# Patient Record
Sex: FEMALE | Race: WHITE | NOT HISPANIC OR LATINO | Employment: OTHER | URBAN - NONMETROPOLITAN AREA
[De-identification: names, ages, dates, MRNs, and addresses within clinical notes are randomized per-mention and may not be internally consistent; named-entity substitution may affect disease eponyms.]

---

## 2019-12-27 ENCOUNTER — APPOINTMENT (EMERGENCY)
Dept: CT IMAGING | Facility: HOSPITAL | Age: 79
DRG: 481 | End: 2019-12-27
Payer: COMMERCIAL

## 2019-12-27 ENCOUNTER — APPOINTMENT (EMERGENCY)
Dept: RADIOLOGY | Facility: HOSPITAL | Age: 79
DRG: 481 | End: 2019-12-27
Payer: COMMERCIAL

## 2019-12-27 ENCOUNTER — HOSPITAL ENCOUNTER (INPATIENT)
Facility: HOSPITAL | Age: 79
LOS: 4 days | Discharge: NURSING, REHAB OR LONG-TERM CARE | DRG: 481 | End: 2019-12-31
Attending: EMERGENCY MEDICINE | Admitting: INTERNAL MEDICINE
Payer: COMMERCIAL

## 2019-12-27 DIAGNOSIS — S00.93XA CONTUSION OF HEAD: ICD-10-CM
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
## 2019-12-27 DIAGNOSIS — S72.001A CLOSED DISPLACED FRACTURE OF RIGHT FEMORAL NECK (HCC): ICD-10-CM

## 2019-12-27 DIAGNOSIS — N30.00 ACUTE CYSTITIS WITHOUT HEMATURIA: ICD-10-CM

## 2019-12-27 PROBLEM — I10 ESSENTIAL HYPERTENSION: Status: ACTIVE | Noted: 2019-12-27

## 2019-12-27 PROBLEM — G20 PARKINSON DISEASE (HCC): Status: ACTIVE | Noted: 2019-12-27
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
## 2019-12-27 LAB
ANION GAP SERPL CALCULATED.3IONS-SCNC: 9 MMOL/L (ref 4–13)
BACTERIA UR QL AUTO: ABNORMAL /HPF
BASOPHILS # BLD AUTO: 0.04 THOUSANDS/ΜL (ref 0–0.1)
BASOPHILS NFR BLD AUTO: 1 % (ref 0–1)
BILIRUB UR QL STRIP: NEGATIVE
BUN SERPL-MCNC: 23 MG/DL (ref 5–25)
CALCIUM SERPL-MCNC: 10.1 MG/DL (ref 8.3–10.1)
CHLORIDE SERPL-SCNC: 102 MMOL/L (ref 100–108)
CLARITY UR: ABNORMAL
CO2 SERPL-SCNC: 27 MMOL/L (ref 21–32)
COLOR UR: YELLOW
CREAT SERPL-MCNC: 0.87 MG/DL (ref 0.6–1.3)
EOSINOPHIL # BLD AUTO: 0.13 THOUSAND/ΜL (ref 0–0.61)
EOSINOPHIL NFR BLD AUTO: 2 % (ref 0–6)
ERYTHROCYTE [DISTWIDTH] IN BLOOD BY AUTOMATED COUNT: 14.9 % (ref 11.6–15.1)
GFR SERPL CREATININE-BSD FRML MDRD: 63 ML/MIN/1.73SQ M
GLUCOSE SERPL-MCNC: 95 MG/DL (ref 65–140)
GLUCOSE UR STRIP-MCNC: NEGATIVE MG/DL
HCT VFR BLD AUTO: 38.1 % (ref 34.8–46.1)
HGB BLD-MCNC: 12 G/DL (ref 11.5–15.4)
HGB UR QL STRIP.AUTO: NEGATIVE
IMM GRANULOCYTES # BLD AUTO: 0.07 THOUSAND/UL (ref 0–0.2)
IMM GRANULOCYTES NFR BLD AUTO: 1 % (ref 0–2)
INR PPP: 1.05 (ref 0.84–1.19)
KETONES UR STRIP-MCNC: NEGATIVE MG/DL
LEUKOCYTE ESTERASE UR QL STRIP: NEGATIVE
LYMPHOCYTES # BLD AUTO: 1.37 THOUSANDS/ΜL (ref 0.6–4.47)
LYMPHOCYTES NFR BLD AUTO: 21 % (ref 14–44)
MAGNESIUM SERPL-MCNC: 1.9 MG/DL (ref 1.6–2.6)
MCH RBC QN AUTO: 28.7 PG (ref 26.8–34.3)
MCHC RBC AUTO-ENTMCNC: 31.5 G/DL (ref 31.4–37.4)
MCV RBC AUTO: 91 FL (ref 82–98)
MONOCYTES # BLD AUTO: 0.45 THOUSAND/ΜL (ref 0.17–1.22)
MONOCYTES NFR BLD AUTO: 7 % (ref 4–12)
NEUTROPHILS # BLD AUTO: 4.45 THOUSANDS/ΜL (ref 1.85–7.62)
NEUTS SEG NFR BLD AUTO: 68 % (ref 43–75)
NITRITE UR QL STRIP: POSITIVE
NON-SQ EPI CELLS URNS QL MICRO: ABNORMAL /HPF
NRBC BLD AUTO-RTO: 0 /100 WBCS
PH UR STRIP.AUTO: 5.5 [PH]
PLATELET # BLD AUTO: 193 THOUSANDS/UL (ref 149–390)
PMV BLD AUTO: 9.9 FL (ref 8.9–12.7)
POTASSIUM SERPL-SCNC: 3.4 MMOL/L (ref 3.5–5.3)
PROT UR STRIP-MCNC: NEGATIVE MG/DL
PROTHROMBIN TIME: 13.7 SECONDS (ref 11.6–14.5)
RBC # BLD AUTO: 4.18 MILLION/UL (ref 3.81–5.12)
RBC #/AREA URNS AUTO: ABNORMAL /HPF
SODIUM SERPL-SCNC: 138 MMOL/L (ref 136–145)
SP GR UR STRIP.AUTO: 1.02 (ref 1–1.03)
UROBILINOGEN UR QL STRIP.AUTO: 0.2 E.U./DL
WBC # BLD AUTO: 6.51 THOUSAND/UL (ref 4.31–10.16)
WBC #/AREA URNS AUTO: ABNORMAL /HPF

## 2019-12-27 PROCEDURE — 72125 CT NECK SPINE W/O DYE: CPT

## 2019-12-27 PROCEDURE — 99222 1ST HOSP IP/OBS MODERATE 55: CPT | Performed by: PHYSICIAN ASSISTANT

## 2019-12-27 PROCEDURE — 99285 EMERGENCY DEPT VISIT HI MDM: CPT | Performed by: EMERGENCY MEDICINE

## 2019-12-27 PROCEDURE — 87186 SC STD MICRODIL/AGAR DIL: CPT | Performed by: PHYSICIAN ASSISTANT

## 2019-12-27 PROCEDURE — 87077 CULTURE AEROBIC IDENTIFY: CPT | Performed by: PHYSICIAN ASSISTANT

## 2019-12-27 PROCEDURE — 85025 COMPLETE CBC W/AUTO DIFF WBC: CPT | Performed by: EMERGENCY MEDICINE

## 2019-12-27 PROCEDURE — 73700 CT LOWER EXTREMITY W/O DYE: CPT

## 2019-12-27 PROCEDURE — 96365 THER/PROPH/DIAG IV INF INIT: CPT

## 2019-12-27 PROCEDURE — 71045 X-RAY EXAM CHEST 1 VIEW: CPT

## 2019-12-27 PROCEDURE — 87086 URINE CULTURE/COLONY COUNT: CPT | Performed by: PHYSICIAN ASSISTANT

## 2019-12-27 PROCEDURE — 99285 EMERGENCY DEPT VISIT HI MDM: CPT

## 2019-12-27 PROCEDURE — 71260 CT THORAX DX C+: CPT

## 2019-12-27 PROCEDURE — 70450 CT HEAD/BRAIN W/O DYE: CPT

## 2019-12-27 PROCEDURE — 80048 BASIC METABOLIC PNL TOTAL CA: CPT | Performed by: EMERGENCY MEDICINE

## 2019-12-27 PROCEDURE — 73502 X-RAY EXAM HIP UNI 2-3 VIEWS: CPT

## 2019-12-27 PROCEDURE — 81001 URINALYSIS AUTO W/SCOPE: CPT

## 2019-12-27 PROCEDURE — 83735 ASSAY OF MAGNESIUM: CPT | Performed by: EMERGENCY MEDICINE

## 2019-12-27 PROCEDURE — 36415 COLL VENOUS BLD VENIPUNCTURE: CPT | Performed by: EMERGENCY MEDICINE

## 2019-12-27 PROCEDURE — 85610 PROTHROMBIN TIME: CPT | Performed by: EMERGENCY MEDICINE

## 2019-12-27 RX ORDER — HYDROCHLOROTHIAZIDE 12.5 MG/1
12.5 TABLET ORAL ONCE
Status: COMPLETED | OUTPATIENT
Start: 2019-12-27 | End: 2019-12-27

## 2019-12-27 RX ORDER — ACETAMINOPHEN 325 MG/1
975 TABLET ORAL ONCE
Status: COMPLETED | OUTPATIENT
Start: 2019-12-27 | End: 2019-12-27

## 2019-12-27 RX ORDER — LABETALOL 20 MG/4 ML (5 MG/ML) INTRAVENOUS SYRINGE
10 ONCE
Status: COMPLETED | OUTPATIENT
Start: 2019-12-27 | End: 2019-12-27

## 2019-12-27 RX ORDER — HYDROCHLOROTHIAZIDE 12.5 MG/1
12.5 CAPSULE, GELATIN COATED ORAL DAILY
COMMUNITY
Start: 2019-09-24

## 2019-12-27 RX ORDER — CHLORAL HYDRATE 500 MG
1000 CAPSULE ORAL DAILY
COMMUNITY

## 2019-12-27 RX ORDER — PROPRANOLOL HYDROCHLORIDE 20 MG/1
20 TABLET ORAL 2 TIMES DAILY
COMMUNITY
Start: 2019-09-24

## 2019-12-27 RX ORDER — POTASSIUM CHLORIDE 20 MEQ/1
40 TABLET, EXTENDED RELEASE ORAL ONCE
Status: COMPLETED | OUTPATIENT
Start: 2019-12-27 | End: 2019-12-27

## 2019-12-27 RX ORDER — FERROUS SULFATE 325(65) MG
325 TABLET ORAL
COMMUNITY

## 2019-12-27 RX ORDER — PROPRANOLOL HYDROCHLORIDE 20 MG/1
20 TABLET ORAL ONCE
Status: COMPLETED | OUTPATIENT
Start: 2019-12-27 | End: 2019-12-27

## 2019-12-27 RX ORDER — ERGOCALCIFEROL 1.25 MG/1
50000 CAPSULE ORAL WEEKLY
COMMUNITY

## 2019-12-27 RX ORDER — CEFAZOLIN SODIUM 2 G/50ML
2000 SOLUTION INTRAVENOUS ONCE
Status: COMPLETED | OUTPATIENT
Start: 2019-12-27 | End: 2019-12-27

## 2019-12-27 RX ORDER — FUROSEMIDE 20 MG/1
20 TABLET ORAL DAILY
COMMUNITY
Start: 2019-11-05 | End: 2021-11-11

## 2019-12-27 RX ADMIN — ACETAMINOPHEN 975 MG: 325 TABLET, FILM COATED ORAL at 22:23

## 2019-12-27 RX ADMIN — HYDROCHLOROTHIAZIDE 12.5 MG: 12.5 TABLET ORAL at 22:38

## 2019-12-27 RX ADMIN — POTASSIUM CHLORIDE 40 MEQ: 1500 TABLET, EXTENDED RELEASE ORAL at 22:24

## 2019-12-27 RX ADMIN — IOHEXOL 100 ML: 350 INJECTION, SOLUTION INTRAVENOUS at 21:40

## 2019-12-27 RX ADMIN — PROPRANOLOL HYDROCHLORIDE 20 MG: 20 TABLET ORAL at 22:22

## 2019-12-27 RX ADMIN — LABETALOL 20 MG/4 ML (5 MG/ML) INTRAVENOUS SYRINGE 10 MG: at 23:55

## 2019-12-27 RX ADMIN — CEFAZOLIN SODIUM 2000 MG: 2 SOLUTION INTRAVENOUS at 21:14

## 2019-12-28 ENCOUNTER — ANESTHESIA EVENT (INPATIENT)
Dept: PERIOP | Facility: HOSPITAL | Age: 79
DRG: 481 | End: 2019-12-28
Payer: COMMERCIAL

## 2019-12-28 ENCOUNTER — ANESTHESIA (INPATIENT)
Dept: PERIOP | Facility: HOSPITAL | Age: 79
DRG: 481 | End: 2019-12-28
Payer: COMMERCIAL

## 2019-12-28 ENCOUNTER — APPOINTMENT (INPATIENT)
Dept: RADIOLOGY | Facility: HOSPITAL | Age: 79
DRG: 481 | End: 2019-12-28
Payer: COMMERCIAL

## 2019-12-28 PROBLEM — E87.6 HYPOKALEMIA: Status: ACTIVE | Noted: 2019-12-28

## 2019-12-28 PROBLEM — S72.001A CLOSED DISPLACED FRACTURE OF RIGHT FEMORAL NECK (HCC): Status: ACTIVE | Noted: 2019-12-27

## 2019-12-28 PROBLEM — I16.0 HYPERTENSIVE URGENCY: Status: ACTIVE | Noted: 2019-12-27

## 2019-12-28 PROBLEM — I48.21 ATRIAL FIBRILLATION, PERMANENT (HCC): Status: ACTIVE | Noted: 2019-12-28

## 2019-12-28 LAB
ALBUMIN SERPL BCP-MCNC: 3.3 G/DL (ref 3.5–5)
ALP SERPL-CCNC: 77 U/L (ref 46–116)
ALT SERPL W P-5'-P-CCNC: 16 U/L (ref 12–78)
ANION GAP SERPL CALCULATED.3IONS-SCNC: 9 MMOL/L (ref 4–13)
APTT PPP: 30 SECONDS (ref 23–37)
AST SERPL W P-5'-P-CCNC: 23 U/L (ref 5–45)
BASOPHILS # BLD AUTO: 0.05 THOUSANDS/ΜL (ref 0–0.1)
BASOPHILS NFR BLD AUTO: 1 % (ref 0–1)
BILIRUB SERPL-MCNC: 0.8 MG/DL (ref 0.2–1)
BUN SERPL-MCNC: 17 MG/DL (ref 5–25)
CALCIUM SERPL-MCNC: 9.6 MG/DL (ref 8.3–10.1)
CHLORIDE SERPL-SCNC: 102 MMOL/L (ref 100–108)
CO2 SERPL-SCNC: 26 MMOL/L (ref 21–32)
CREAT SERPL-MCNC: 0.81 MG/DL (ref 0.6–1.3)
EOSINOPHIL # BLD AUTO: 0.09 THOUSAND/ΜL (ref 0–0.61)
EOSINOPHIL NFR BLD AUTO: 1 % (ref 0–6)
ERYTHROCYTE [DISTWIDTH] IN BLOOD BY AUTOMATED COUNT: 14.8 % (ref 11.6–15.1)
GFR SERPL CREATININE-BSD FRML MDRD: 68 ML/MIN/1.73SQ M
GLUCOSE SERPL-MCNC: 107 MG/DL (ref 65–140)
HCT VFR BLD AUTO: 36.2 % (ref 34.8–46.1)
HGB BLD-MCNC: 11.5 G/DL (ref 11.5–15.4)
IMM GRANULOCYTES # BLD AUTO: 0.04 THOUSAND/UL (ref 0–0.2)
IMM GRANULOCYTES NFR BLD AUTO: 1 % (ref 0–2)
INR PPP: 1.16 (ref 0.84–1.19)
LYMPHOCYTES # BLD AUTO: 0.83 THOUSANDS/ΜL (ref 0.6–4.47)
LYMPHOCYTES NFR BLD AUTO: 11 % (ref 14–44)
MAGNESIUM SERPL-MCNC: 1.8 MG/DL (ref 1.6–2.6)
MCH RBC QN AUTO: 28.6 PG (ref 26.8–34.3)
MCHC RBC AUTO-ENTMCNC: 31.8 G/DL (ref 31.4–37.4)
MCV RBC AUTO: 90 FL (ref 82–98)
MONOCYTES # BLD AUTO: 0.5 THOUSAND/ΜL (ref 0.17–1.22)
MONOCYTES NFR BLD AUTO: 7 % (ref 4–12)
NEUTROPHILS # BLD AUTO: 6.01 THOUSANDS/ΜL (ref 1.85–7.62)
NEUTS SEG NFR BLD AUTO: 79 % (ref 43–75)
NRBC BLD AUTO-RTO: 0 /100 WBCS
PHOSPHATE SERPL-MCNC: 2.9 MG/DL (ref 2.3–4.1)
PLATELET # BLD AUTO: 168 THOUSANDS/UL (ref 149–390)
PMV BLD AUTO: 10 FL (ref 8.9–12.7)
POTASSIUM SERPL-SCNC: 3.9 MMOL/L (ref 3.5–5.3)
PROT SERPL-MCNC: 6.8 G/DL (ref 6.4–8.2)
PROTHROMBIN TIME: 14.9 SECONDS (ref 11.6–14.5)
RBC # BLD AUTO: 4.02 MILLION/UL (ref 3.81–5.12)
SODIUM SERPL-SCNC: 137 MMOL/L (ref 136–145)
WBC # BLD AUTO: 7.52 THOUSAND/UL (ref 4.31–10.16)

## 2019-12-28 PROCEDURE — 99232 SBSQ HOSP IP/OBS MODERATE 35: CPT | Performed by: PHYSICIAN ASSISTANT

## 2019-12-28 PROCEDURE — 85730 THROMBOPLASTIN TIME PARTIAL: CPT | Performed by: PHYSICIAN ASSISTANT

## 2019-12-28 PROCEDURE — C1713 ANCHOR/SCREW BN/BN,TIS/BN: HCPCS | Performed by: ORTHOPAEDIC SURGERY

## 2019-12-28 PROCEDURE — 27235 TREAT THIGH FRACTURE: CPT | Performed by: ORTHOPAEDIC SURGERY

## 2019-12-28 PROCEDURE — 73502 X-RAY EXAM HIP UNI 2-3 VIEWS: CPT

## 2019-12-28 PROCEDURE — 84100 ASSAY OF PHOSPHORUS: CPT | Performed by: PHYSICIAN ASSISTANT

## 2019-12-28 PROCEDURE — 85025 COMPLETE CBC W/AUTO DIFF WBC: CPT | Performed by: PHYSICIAN ASSISTANT

## 2019-12-28 PROCEDURE — 0QS804Z REPOSITION RIGHT FEMORAL SHAFT WITH INTERNAL FIXATION DEVICE, OPEN APPROACH: ICD-10-PCS | Performed by: ORTHOPAEDIC SURGERY

## 2019-12-28 PROCEDURE — 80053 COMPREHEN METABOLIC PANEL: CPT | Performed by: PHYSICIAN ASSISTANT

## 2019-12-28 PROCEDURE — 83735 ASSAY OF MAGNESIUM: CPT | Performed by: PHYSICIAN ASSISTANT

## 2019-12-28 PROCEDURE — 27235 TREAT THIGH FRACTURE: CPT | Performed by: PHYSICIAN ASSISTANT

## 2019-12-28 PROCEDURE — 85610 PROTHROMBIN TIME: CPT | Performed by: PHYSICIAN ASSISTANT

## 2019-12-28 DEVICE — IMPLANTABLE DEVICE: Type: IMPLANTABLE DEVICE | Site: HIP | Status: FUNCTIONAL

## 2019-12-28 RX ORDER — HYDRALAZINE HYDROCHLORIDE 20 MG/ML
10 INJECTION INTRAMUSCULAR; INTRAVENOUS ONCE
Status: COMPLETED | OUTPATIENT
Start: 2019-12-28 | End: 2019-12-28

## 2019-12-28 RX ORDER — OXYCODONE HYDROCHLORIDE 10 MG/1
10 TABLET ORAL EVERY 4 HOURS PRN
Status: DISCONTINUED | OUTPATIENT
Start: 2019-12-28 | End: 2019-12-31 | Stop reason: HOSPADM

## 2019-12-28 RX ORDER — IRON POLYSACCHARIDE COMPLEX 150 MG
150 CAPSULE ORAL
Status: DISCONTINUED | OUTPATIENT
Start: 2019-12-28 | End: 2019-12-31 | Stop reason: HOSPADM

## 2019-12-28 RX ORDER — DEXAMETHASONE SODIUM PHOSPHATE 4 MG/ML
INJECTION, SOLUTION INTRA-ARTICULAR; INTRALESIONAL; INTRAMUSCULAR; INTRAVENOUS; SOFT TISSUE AS NEEDED
Status: DISCONTINUED | OUTPATIENT
Start: 2019-12-28 | End: 2019-12-28 | Stop reason: SURG

## 2019-12-28 RX ORDER — CEFAZOLIN SODIUM 1 G/50ML
1000 SOLUTION INTRAVENOUS EVERY 8 HOURS
Status: DISCONTINUED | OUTPATIENT
Start: 2019-12-28 | End: 2019-12-28

## 2019-12-28 RX ORDER — LABETALOL 20 MG/4 ML (5 MG/ML) INTRAVENOUS SYRINGE
5 EVERY 4 HOURS PRN
Status: DISCONTINUED | OUTPATIENT
Start: 2019-12-28 | End: 2019-12-31 | Stop reason: HOSPADM

## 2019-12-28 RX ORDER — FENTANYL CITRATE 50 UG/ML
INJECTION, SOLUTION INTRAMUSCULAR; INTRAVENOUS AS NEEDED
Status: DISCONTINUED | OUTPATIENT
Start: 2019-12-28 | End: 2019-12-28 | Stop reason: SURG

## 2019-12-28 RX ORDER — SODIUM CHLORIDE, SODIUM LACTATE, POTASSIUM CHLORIDE, CALCIUM CHLORIDE 600; 310; 30; 20 MG/100ML; MG/100ML; MG/100ML; MG/100ML
125 INJECTION, SOLUTION INTRAVENOUS CONTINUOUS
Status: ACTIVE | OUTPATIENT
Start: 2019-12-28 | End: 2019-12-28

## 2019-12-28 RX ORDER — CHLORHEXIDINE GLUCONATE 4 G/100ML
SOLUTION TOPICAL DAILY PRN
Status: CANCELLED | OUTPATIENT
Start: 2019-12-28

## 2019-12-28 RX ORDER — ONDANSETRON 2 MG/ML
4 INJECTION INTRAMUSCULAR; INTRAVENOUS EVERY 6 HOURS PRN
Status: DISCONTINUED | OUTPATIENT
Start: 2019-12-28 | End: 2019-12-31 | Stop reason: HOSPADM

## 2019-12-28 RX ORDER — CEFAZOLIN SODIUM 2 G/50ML
2000 SOLUTION INTRAVENOUS EVERY 8 HOURS
Status: DISCONTINUED | OUTPATIENT
Start: 2019-12-28 | End: 2019-12-31 | Stop reason: HOSPADM

## 2019-12-28 RX ORDER — ONDANSETRON 2 MG/ML
INJECTION INTRAMUSCULAR; INTRAVENOUS AS NEEDED
Status: DISCONTINUED | OUTPATIENT
Start: 2019-12-28 | End: 2019-12-28 | Stop reason: SURG

## 2019-12-28 RX ORDER — HEPARIN SODIUM 5000 [USP'U]/ML
5000 INJECTION, SOLUTION INTRAVENOUS; SUBCUTANEOUS EVERY 8 HOURS SCHEDULED
Status: DISCONTINUED | OUTPATIENT
Start: 2019-12-28 | End: 2019-12-29

## 2019-12-28 RX ORDER — CEFAZOLIN SODIUM 2 G/50ML
2000 SOLUTION INTRAVENOUS ONCE
Status: CANCELLED | OUTPATIENT
Start: 2019-12-28 | End: 2019-12-28

## 2019-12-28 RX ORDER — HYDROCHLOROTHIAZIDE 12.5 MG/1
12.5 TABLET ORAL 2 TIMES DAILY
Status: DISCONTINUED | OUTPATIENT
Start: 2019-12-28 | End: 2019-12-28

## 2019-12-28 RX ORDER — LIDOCAINE HYDROCHLORIDE 10 MG/ML
INJECTION, SOLUTION EPIDURAL; INFILTRATION; INTRACAUDAL; PERINEURAL AS NEEDED
Status: DISCONTINUED | OUTPATIENT
Start: 2019-12-28 | End: 2019-12-28 | Stop reason: SURG

## 2019-12-28 RX ORDER — CHLORAL HYDRATE 500 MG
1000 CAPSULE ORAL DAILY
Status: DISCONTINUED | OUTPATIENT
Start: 2019-12-28 | End: 2019-12-31 | Stop reason: HOSPADM

## 2019-12-28 RX ORDER — OXYCODONE HYDROCHLORIDE 5 MG/1
5 TABLET ORAL EVERY 4 HOURS PRN
Status: DISCONTINUED | OUTPATIENT
Start: 2019-12-28 | End: 2019-12-31 | Stop reason: HOSPADM

## 2019-12-28 RX ORDER — BUPIVACAINE HYDROCHLORIDE AND EPINEPHRINE 2.5; 5 MG/ML; UG/ML
INJECTION, SOLUTION INFILTRATION; PERINEURAL AS NEEDED
Status: DISCONTINUED | OUTPATIENT
Start: 2019-12-28 | End: 2019-12-28 | Stop reason: HOSPADM

## 2019-12-28 RX ORDER — ROCURONIUM BROMIDE 10 MG/ML
INJECTION, SOLUTION INTRAVENOUS AS NEEDED
Status: DISCONTINUED | OUTPATIENT
Start: 2019-12-28 | End: 2019-12-28 | Stop reason: SURG

## 2019-12-28 RX ORDER — PROPOFOL 10 MG/ML
INJECTION, EMULSION INTRAVENOUS AS NEEDED
Status: DISCONTINUED | OUTPATIENT
Start: 2019-12-28 | End: 2019-12-28 | Stop reason: SURG

## 2019-12-28 RX ORDER — PROPRANOLOL HYDROCHLORIDE 20 MG/1
20 TABLET ORAL 2 TIMES DAILY
Status: DISCONTINUED | OUTPATIENT
Start: 2019-12-28 | End: 2019-12-31 | Stop reason: HOSPADM

## 2019-12-28 RX ORDER — ACETAMINOPHEN 325 MG/1
650 TABLET ORAL EVERY 6 HOURS PRN
Status: DISCONTINUED | OUTPATIENT
Start: 2019-12-28 | End: 2019-12-31 | Stop reason: HOSPADM

## 2019-12-28 RX ORDER — FERROUS SULFATE 325(65) MG
325 TABLET ORAL
Status: DISCONTINUED | OUTPATIENT
Start: 2019-12-28 | End: 2019-12-31 | Stop reason: HOSPADM

## 2019-12-28 RX ORDER — TIMOLOL MALEATE 5 MG/ML
1 SOLUTION/ DROPS OPHTHALMIC 2 TIMES DAILY
Status: DISCONTINUED | OUTPATIENT
Start: 2019-12-28 | End: 2019-12-31 | Stop reason: HOSPADM

## 2019-12-28 RX ORDER — HEPARIN SODIUM 5000 [USP'U]/ML
5000 INJECTION, SOLUTION INTRAVENOUS; SUBCUTANEOUS EVERY 8 HOURS SCHEDULED
Status: DISCONTINUED | OUTPATIENT
Start: 2019-12-28 | End: 2019-12-28

## 2019-12-28 RX ORDER — SODIUM CHLORIDE, SODIUM LACTATE, POTASSIUM CHLORIDE, CALCIUM CHLORIDE 600; 310; 30; 20 MG/100ML; MG/100ML; MG/100ML; MG/100ML
INJECTION, SOLUTION INTRAVENOUS CONTINUOUS PRN
Status: DISCONTINUED | OUTPATIENT
Start: 2019-12-28 | End: 2019-12-28 | Stop reason: SURG

## 2019-12-28 RX ADMIN — PHENYLEPHRINE HYDROCHLORIDE 100 MCG: 10 INJECTION INTRAVENOUS at 11:09

## 2019-12-28 RX ADMIN — PROPOFOL 100 MG: 10 INJECTION, EMULSION INTRAVENOUS at 11:09

## 2019-12-28 RX ADMIN — PROPOFOL 50 MG: 10 INJECTION, EMULSION INTRAVENOUS at 11:47

## 2019-12-28 RX ADMIN — ONDANSETRON HYDROCHLORIDE 4 MG: 2 INJECTION, SOLUTION INTRAVENOUS at 11:56

## 2019-12-28 RX ADMIN — LIDOCAINE HYDROCHLORIDE 50 MG: 10 INJECTION, SOLUTION EPIDURAL; INFILTRATION; INTRACAUDAL; PERINEURAL at 11:09

## 2019-12-28 RX ADMIN — PROPRANOLOL HYDROCHLORIDE 20 MG: 20 TABLET ORAL at 09:45

## 2019-12-28 RX ADMIN — CEFAZOLIN SODIUM 2000 MG: 2 SOLUTION INTRAVENOUS at 21:15

## 2019-12-28 RX ADMIN — HEPARIN SODIUM 5000 UNITS: 5000 INJECTION INTRAVENOUS; SUBCUTANEOUS at 06:15

## 2019-12-28 RX ADMIN — SODIUM CHLORIDE, SODIUM LACTATE, POTASSIUM CHLORIDE, AND CALCIUM CHLORIDE: .6; .31; .03; .02 INJECTION, SOLUTION INTRAVENOUS at 11:01

## 2019-12-28 RX ADMIN — CARBIDOPA AND LEVODOPA 1 TABLET: 25; 100 TABLET ORAL at 21:11

## 2019-12-28 RX ADMIN — ROCURONIUM BROMIDE 30 MG: 50 INJECTION, SOLUTION INTRAVENOUS at 11:09

## 2019-12-28 RX ADMIN — CARBIDOPA AND LEVODOPA 1 TABLET: 25; 100 TABLET ORAL at 17:16

## 2019-12-28 RX ADMIN — HEPARIN SODIUM 5000 UNITS: 5000 INJECTION INTRAVENOUS; SUBCUTANEOUS at 00:46

## 2019-12-28 RX ADMIN — TIMOLOL MALEATE 1 DROP: 5 SOLUTION/ DROPS OPHTHALMIC at 13:29

## 2019-12-28 RX ADMIN — HEPARIN SODIUM 5000 UNITS: 5000 INJECTION INTRAVENOUS; SUBCUTANEOUS at 21:11

## 2019-12-28 RX ADMIN — PROPRANOLOL HYDROCHLORIDE 20 MG: 20 TABLET ORAL at 17:16

## 2019-12-28 RX ADMIN — SUGAMMADEX 100 MG: 100 INJECTION, SOLUTION INTRAVENOUS at 12:01

## 2019-12-28 RX ADMIN — CEFAZOLIN SODIUM 2000 MG: 2 SOLUTION INTRAVENOUS at 04:46

## 2019-12-28 RX ADMIN — FENTANYL CITRATE 25 MCG: 50 INJECTION, SOLUTION INTRAMUSCULAR; INTRAVENOUS at 11:38

## 2019-12-28 RX ADMIN — POLYSACCHARIDE-IRON COMPLEX 150 MG: 150 CAPSULE ORAL at 09:46

## 2019-12-28 RX ADMIN — CARBIDOPA AND LEVODOPA 1 TABLET: 25; 100 TABLET ORAL at 09:46

## 2019-12-28 RX ADMIN — TIMOLOL MALEATE 1 DROP: 5 SOLUTION/ DROPS OPHTHALMIC at 17:17

## 2019-12-28 RX ADMIN — FENTANYL CITRATE 50 MCG: 50 INJECTION, SOLUTION INTRAMUSCULAR; INTRAVENOUS at 11:47

## 2019-12-28 RX ADMIN — OMEGA-3 FATTY ACIDS CAP 1000 MG 1000 MG: 1000 CAP at 09:45

## 2019-12-28 RX ADMIN — FENTANYL CITRATE 25 MCG: 50 INJECTION, SOLUTION INTRAMUSCULAR; INTRAVENOUS at 11:32

## 2019-12-28 RX ADMIN — DEXAMETHASONE SODIUM PHOSPHATE 4 MG: 4 INJECTION, SOLUTION INTRAMUSCULAR; INTRAVENOUS at 11:40

## 2019-12-28 RX ADMIN — FERROUS SULFATE TAB 325 MG (65 MG ELEMENTAL FE) 325 MG: 325 (65 FE) TAB at 09:45

## 2019-12-28 RX ADMIN — HYDRALAZINE HYDROCHLORIDE 10 MG: 20 INJECTION INTRAMUSCULAR; INTRAVENOUS at 09:45

## 2019-12-28 RX ADMIN — CEFAZOLIN SODIUM 2000 MG: 2 SOLUTION INTRAVENOUS at 11:10

## 2019-12-28 RX ADMIN — LABETALOL HYDROCHLORIDE 5 MG: 5 INJECTION, SOLUTION INTRAVENOUS at 01:22

## 2019-12-28 RX ADMIN — PHENYLEPHRINE HYDROCHLORIDE 40 MCG/MIN: 10 INJECTION INTRAVENOUS at 11:29

## 2019-12-28 RX ADMIN — PHENYLEPHRINE HYDROCHLORIDE 100 MCG: 10 INJECTION INTRAVENOUS at 11:13

## 2019-12-28 NOTE — ASSESSMENT & PLAN NOTE
UA shows-positive nitrites, 4-10 WBCs, innumerable bacteria  She denies abdominal pain, dysuria  Started on IV cephazolin in the ER  Urine culture pending  Continue IV cefazolin for now  Monitor renal output/renal function  AM labs  Supportive care

## 2019-12-28 NOTE — ANESTHESIA PREPROCEDURE EVALUATION
Review of Systems/Medical History      History of anesthetic complications     Cardiovascular  Hypertension , Cardiac stents > 1 year Dysrhythmias , atrial fibrillation,   Comment: Cardiac stents 2003    afib (eliquis),  Pulmonary  Negative pulmonary ROS        GI/Hepatic  Negative GI/hepatic ROS               Endo/Other  Negative endo/other ROS      GYN       Hematology  Negative hematology ROS      Musculoskeletal    Comment: Hip fx      Neurology  Negative neurology ROS      Psychology       Comment: PD         Physical Exam    Airway    Mallampati score: III  TM Distance: >3 FB  Neck ROM: full     Dental   upper dentures,     Cardiovascular      Pulmonary      Other Findings        Anesthesia Plan  ASA Score- 3     Anesthesia Type- general with ASA Monitors  Additional Monitors:   Airway Plan: ETT  Comment: General anesthesia, endotracheal tube; standard ASA monitors  Risks and benefits discussed with patient; patient consented and agrees to proceed  I saw and evaluated the patient  If seen with CRNA, we have discussed the anesthetic plan and I am in agreement that the plan is appropriate for the patient        Plan Factors-    Induction- intravenous  Postoperative Plan- Plan for postoperative opioid use  Planned trial extubation    Informed Consent- Anesthetic plan and risks discussed with patient  I personally reviewed this patient with the CRNA  Discussed and agreed on the Anesthesia Plan with the CRNA  Derek Barlow

## 2019-12-28 NOTE — ASSESSMENT & PLAN NOTE
She had a fall while exiting her car  She was unable to bear weight or stand after the fall  X-ray right hip completed official report pending  CT right hip shows-Nondisplaced fracture of the femoral neck as described above  ER attending discussed case with Dr Aliya Parkinson (orthopedic surgery)  Admit to med surg  Hold Xarelto  Encourage to limit excessive movement  Pain management PRN  Zofran PRN  Orthopedic surgery consult   Possible OR Sunday or Monday per Dr Adolph hendrix

## 2019-12-28 NOTE — SOCIAL WORK
Pt going to the OR today for repair of right hip fx  CM will follow up post op re: jean-claude planning

## 2019-12-28 NOTE — ASSESSMENT & PLAN NOTE
Rate currently controlled   Anticoagulated on Xarelto  Will hold Xarelto due to planned OR intervention for hip fracture  Monitor closely

## 2019-12-28 NOTE — H&P
[facility] SPECIALTY [facility] Internal Medicine  H&P- Matias An 1940, 78 y o  female MRN: [id number]    Unit/Bed#: 411-01 Encounter: [id number]    Primary Care Provider: Jeannette Brandt MD   Date and time admitted to hospital: 12/27/2019  7:34 PM        * Closed right hip fracture [facility])  Assessment & Plan  She had a fall while exiting her car  She was unable to bear weight or stand after the fall  X-ray right hip completed official report pending  CT right hip shows-Nondisplaced fracture of the femoral neck as described above  ER attending discussed case with Dr Severino Pedraza (orthopedic surgery)  Admit to med surg  Hold Xarelto  Encourage to limit excessive movement  Pain management PRN  Zofran PRN  Orthopedic surgery consult  Possible OR Sunday or Monday per Dr Severino Pedraza  Am labs  Supportive care      Hypokalemia  Assessment & Plan  Potassium mildly decreased 3 4  She received potassium chloride 40 mg once p o  In the ER  A m  CMP to monitor potassium level    Atrial fibrillation, permanent  Assessment & Plan  Rate currently controlled   Anticoagulated on Xarelto  Will hold Xarelto due to planned OR intervention for hip fracture  Monitor closely    Fall from slip, trip, or stumble, initial encounter  [address] [place] [address]  She report that she lost her balance while coming out of her car  She states that she was ok prior to fall  She did hit he head sustating a hematoma to right side of her forehead  She denies LOC, Headache, Visual disturbances  Head CT shows-   No acute cranial hemorrhage, mass effect or extra-axial collection  2   Right frontal scalp hematoma   No acute calvarial fracture  CT cervical spine shows-No acute cervical spine fracture or traumatic malalignment    She does of radiologic evidence of right femoral neck fracture  Fall precautions  Minimize excessive movement   Supportive care     Acute cystitis without hematuria  Assessment & Plan  UA shows-positive nitrites, 4-10 WBCs, innumerable bacteria  She denies abdominal pain, dysuria  Started on IV cephazolin in the ER  Urine culture pending  Continue IV cefazolin for now  Monitor renal output/renal function  AM labs  Supportive care    Parkinson disease [facility])  Assessment & Plan  Continue Sinemet    Essential hypertension  Assessment & Plan  Blood pressure elevated upon arrival to the ER  She reports that she did not take her evening BP medication  She also has increased pain S/P fall which may be contributing  Continue home medication  PRN IV labetalol  Monitor blood pressure closely  Goal blood pressure of < 675 systolic        VTE Prophylaxis: Heparin  / sequential compression device   Code Status: Level 3 DNAR/DNI  POLST: POLST form is not discussed and not completed at this time  Discussion with family: Family at bedside    Anticipated Length of Stay:  Patient will be admitted on an Inpatient basis with an anticipated length of stay of  > 2 midnights  Justification for Hospital Stay:  Right hip fracture, UTI, mechanical fall initial encounter    Total Time for Visit, including Counseling / Coordination of Care: 30 minutes  Greater than 50% of this total time spent on direct patient counseling and coordination of care  Chief Complaint:   Fall    History of Present Illness:    Adolph Marina is a 78 y o  female who presents to the emergency room for evaluation after a fall while trying to get out of her car  She states that she stumbled and fell did hit her head on the floor but denies loss of consciousness  She states that she was fine prior to the fall did not complain of any dizziness prior to the fall  She reports that she was unable to get up on her own or bear weight on her right leg after the fall  She denies chest pain, shortness of breath, nausea, vomiting, headache, visual disturbances, urinary symptoms abdominal pain  She does admit to pain to the right hip region  Labs completed in emergency room with results as shown below    CT head, CT cervical spine, CT right hip, completed results as shown below  Emergency room she received Tylenol 975 mg p o  Potassium chloride 40 mEq p o  and started on Ancef 2 g IV  ER attending discussed case with Dr Romayne Shouts (orthopedic surgery) recommendations given to admit patient, hold the Xarelto utilize heparin for DVT prophylaxis  Will see patient here on Sunday for possible OR intervention  At bedside patient is awake and alert mild to moderate distress secondary to pain  Patient has been admitted on inpatient status [facility] level care for further management of right hip fracture, mechanical fall, hypokalemia, urinary tract infection  Review of Systems:    Review of Systems   Constitutional: Negative for chills, diaphoresis, fatigue and fever  HENT: Negative for congestion, trouble swallowing and voice change  Eyes: Negative for photophobia, pain, redness and visual disturbance  Respiratory: Negative for cough, chest tightness, shortness of breath and wheezing  Cardiovascular: Negative for chest pain, palpitations and leg swelling  Gastrointestinal: Negative for abdominal pain, diarrhea, nausea and vomiting  Endocrine: Negative for polydipsia, polyphagia and polyuria  Genitourinary: Negative for difficulty urinating, dysuria, flank pain, frequency and hematuria  Musculoskeletal: Positive for gait problem  Negative for arthralgias, back pain, joint swelling, neck pain and neck stiffness  Skin: Negative for color change, pallor, rash and wound  Neurological: Positive for tremors  Negative for dizziness, syncope, weakness and headaches  Psychiatric/Behavioral: Negative for agitation, confusion and sleep disturbance  The patient is not nervous/anxious          Past Medical and Surgical History:     Past Medical History:   Diagnosis Date    [facility]       Past Surgical History:   Procedure Laterality Date    CARDIAC SURGERY      Pt had stent placed in 2003    CHOLECYSTECTOMY      1997    HYSTERECTOMY         Meds/Allergies:    Prior to Admission medications    Medication Sig Start Date End Date Taking? Authorizing Provider   carbidopa-levodopa (SINEMET)  mg per tablet Take 1 tablet by mouth Three times a day 9/24/19  Yes Historical Provider, MD   ergocalciferol (VITAMIN D2) 50,000 units Take 50,000 Units by mouth   Yes Historical Provider, MD   ferrous fumarate-iron polysaccharide complex (TANDEM) 162-115 2 MG CAPS Take 1 capsule by mouth daily with breakfast   Yes Historical Provider, MD   ferrous sulfate 325 (65 Fe) mg tablet Take 325 mg by mouth daily with breakfast   Yes Historical Provider, MD   furosemide (LASIX) 20 mg tablet Take 20 mg by mouth daily 11/5/19 11/4/20 Yes Historical Provider, MD   hydrochlorothiazide (MICROZIDE) 12 5 mg capsule Take 12 5 mg by mouth 9/24/19  Yes Historical Provider, MD   Omega-3 Fatty Acids (FISH OIL) 1,000 mg Take 1,000 mg by mouth daily   Yes Historical Provider, MD   propranolol (INDERAL) 20 mg tablet Take 20 mg by mouth 2 (two) times a day 9/24/19  Yes Historical Provider, MD   timolol (BETIMOL) 0 5 % ophthalmic solution 1 drop 2 (two) times a day   Yes Historical Provider, MD   rivaroxaban (XARELTO) 20 mg tablet Take 20 mg by mouth    Historical Provider, MD     I have reviewed home medications with patient personally  Allergies: No Known Allergies    Social History:     Marital Status: /Civil Union   Occupation: Retired  Patient Pre-hospital Living Situation: Lives with   Patient Pre-hospital Level of Mobility: Active  Patient Pre-hospital Diet Restrictions: None reported  Substance Use History:   Social History     Substance and Sexual Activity   Alcohol Use Never    Frequency: Never     Social History     Tobacco Use   Smoking Status Never Smoker   Smokeless Tobacco Never Used     Social History     Substance and Sexual Activity   Drug Use Never       Family History:    History reviewed   No pertinent family history  Physical Exam:     Vitals:   Blood Pressure: 107/77 (12/28/19 0251)  Pulse: 80 (12/28/19 0110)  Temperature: 98 2 °F (36 8 °C) (12/28/19 0033)  Temp Source: Oral (12/28/19 0033)  Respirations: 17 (12/28/19 0033)  Height: 5' 2" (157 5 cm) (12/28/19 0033)  Weight - Scale: 68 3 kg (150 lb 9 2 oz) (12/28/19 0033)  SpO2: 98 % (12/28/19 0033)    Physical Exam   Constitutional: She is oriented to person, place, and time  She appears distressed  HENT:   Head: Normocephalic  Hematoma to right side of forehead   Eyes: Pupils are equal, round, and reactive to light  EOM are normal  Right eye exhibits no discharge  Left eye exhibits no discharge  No scleral icterus  Neck: Normal range of motion  Neck supple  No JVD present  Cardiovascular: Normal rate and intact distal pulses  Irregularly irregular rhythm   Pulmonary/Chest: Effort normal and breath sounds normal  No stridor  No respiratory distress  She has no wheezes  She has no rales  Abdominal: Soft  Bowel sounds are normal  She exhibits no distension and no mass  There is no tenderness  There is no guarding  Musculoskeletal: She exhibits tenderness  She exhibits no edema or deformity  Decreased range of motion, tenderness, discoloration of right hip   Lymphadenopathy:     She has no cervical adenopathy  Neurological: She is oriented to person, place, and time  Skin: Skin is warm and dry  Capillary refill takes less than 2 seconds  No rash noted  No erythema  No pallor  Psychiatric: She has a normal mood and affect  Vitals reviewed  Additional Data:     Lab Results: I have personally reviewed pertinent reports        Results from last 7 days   Lab Units 12/27/19 2019   WBC Thousand/uL 6 51   HEMOGLOBIN g/dL 12 0   HEMATOCRIT % 38 1   PLATELETS Thousands/uL 193   NEUTROS PCT % 68   LYMPHS PCT % 21   MONOS PCT % 7   EOS PCT % 2     Results from last 7 days   Lab Units 12/27/19 2019   SODIUM mmol/L 138   POTASSIUM mmol/L 3 4* CHLORIDE mmol/L 102   CO2 mmol/L 27   BUN mg/dL 23   CREATININE mg/dL 0 87   ANION GAP mmol/L 9   CALCIUM mg/dL 10 1   GLUCOSE RANDOM mg/dL 95     Results from last 7 days   Lab Units 12/27/19 2019   INR  1 05                   Imaging: I have personally reviewed pertinent reports  CT chest with contrast   Final Result by Sharona Peterson DO (12/27 2324)      No discrete acute process seen in the chest       Fusiform aneurysmal dilatation of the ascending aorta which measures approximately 4 2 cm in maximal dimensions  The aorta is tortuous but no acute aortic process is seen  Predominantly oval-shaped but irregular lesion in the inferior lateral aspect of the left breast which measures approximately 4 5 x 4 0 x 4 1 cm in size  This is best appreciated on axial image 32, series 2  There is some surrounding fat    stranding/edema in the left breast    A component of this may be posttraumatic or related to prior intervention given the patient's history, however correlation with the patient's physical exam as well as follow-up imaging including    mammography/ultrasound could be considered to confirm resolution and exclude underlying breast lesion at the discretion of the referring caregiver  Cardiomegaly, coronary and aortic atherosclerosis, small hiatal hernia, and other findings as above  Findings discussed with Dr Fraser Barley by Dr Alan Nation at 11:20 PM on 12/27/2019  Workstation performed: [id number]         CT hip right without contrast   Final Result by Nii Doyle DO (12/27 2236)      Nondisplaced fracture of the femoral neck as described above  The study was marked in [facility] for immediate notification  Workstation performed: [id number]         CT spine cervical wo contrast   Final Result by Lakia Mcknight MD (12/27 2052)      No acute cervical spine fracture or traumatic malalignment                     Workstation performed: [id number]         CT head wo contrast   Final Result by April Mullen MD (12/27 2056)         1  No acute cranial hemorrhage, mass effect or extra-axial collection  2   Right frontal scalp hematoma  No acute calvarial fracture  Workstation performed: [id number]         XR chest 1 view portable   ED Interpretation by Lexie Pimentel DO (12/27 2104)   Airway is midline  Lungs are clear bilaterally with no evidence of pulmonary vascular congestion/focal infiltrate/pleural effusion//pneumothorax  Cardiac and mediastinal silhouettes are within normal limits  Osseous structures appear normal       XR hip/pelv 2-3 vws right if performed   ED Interpretation by Lexie Pimentel DO (12/27 2104)   Pelvic rings are intact  There is a minimally displaced right femoral neck fracture  EKG, Pathology, and Other Studies Reviewed on Admission:   · EKG:     Allscripts / Epic Records Reviewed: Yes     ** Please Note: This note has been constructed using a voice recognition system   **

## 2019-12-28 NOTE — ASSESSMENT & PLAN NOTE
Rate currently controlled   Anticoagulated on Xarelto  Held Xarelto since yesterday evening due to planned OR intervention for hip fracture  Plan to restart anticoagulation as soon as possible

## 2019-12-28 NOTE — ASSESSMENT & PLAN NOTE
Potassium mildly decreased 3 4  She received potassium chloride 40 mg once p o   In the ER  A m  CMP to monitor potassium level

## 2019-12-28 NOTE — ED NOTES
Patient has bruising to the right eyebrow area that is raised       Alfonza Lesch, RN  12/27/19 6322

## 2019-12-28 NOTE — ASSESSMENT & PLAN NOTE
Potassium mildly decreased 3 4 on admission, now increased to 3 9  She received potassium chloride 40 mg once p o  In the ER  Continue AM BMP

## 2019-12-28 NOTE — ASSESSMENT & PLAN NOTE
UA shows-positive nitrites, 4-10 WBCs, innumerable bacteria with only ocassional epithelial cells    She denies abdominal pain, dysuria  Started on IV cephazolin in the ER  Urine culture pending  Continue IV cefazolin for now  Monitor renal output/renal function  AM labs  Supportive care

## 2019-12-28 NOTE — ASSESSMENT & PLAN NOTE
Blood pressure elevated upon arrival to the ER  She reports that she did not take her evening BP medication  She also has increased pain S/P fall which may be contributing  Continue home medication  PRN IV labetalol  Monitor blood pressure closely   Goal blood pressure of < 593 systolic

## 2019-12-28 NOTE — ASSESSMENT & PLAN NOTE
Appears mechanical in nature - She reports that she lost her balance while coming out of her car  She states that she was ok prior to fall  She did hit he head sustating a hematoma to right side of her forehead  She denies LOC, Headache, Visual disturbances  Head CT shows-   No acute cranial hemorrhage, mass effect or extra-axial collection  2   Right frontal scalp hematoma   No acute calvarial fracture  CT cervical spine shows - No acute cervical spine fracture or traumatic malalignment  Xray and CT R hip shows radiologic evidence of right femoral neck fracture  Continue Fall precautions  Pain control  PT/OT following planned ORIF R hip today

## 2019-12-28 NOTE — PLAN OF CARE
Problem: Potential for Falls  Goal: Patient will remain free of falls  Description  INTERVENTIONS:  - Assess patient frequently for physical needs  -  Identify cognitive and physical deficits and behaviors that affect risk of falls    -  [place] fall precautions as indicated by assessment   - Educate patient/family on patient safety including physical limitations  - Instruct patient to call for assistance with activity based on assessment  - Modify environment to reduce risk of injury  - Consider OT/PT consult to assist with strengthening/mobility  Outcome: Progressing     Problem: Prexisting or High Potential for Compromised Skin Integrity  Goal: Skin integrity is maintained or improved  Description  INTERVENTIONS:  - Identify patients at risk for skin breakdown  - Assess and monitor skin integrity  - Assess and monitor nutrition and hydration status  - Monitor labs   - Assess for incontinence   - Turn and reposition patient  - Assist with mobility/ambulation  - Relieve pressure over bony prominences  - Avoid friction and shearing  - Provide appropriate hygiene as needed including keeping skin clean and dry  - Evaluate need for skin moisturizer/barrier cream  - Collaborate with interdisciplinary team   - Patient/family teaching  - Consider wound care consult   Outcome: Progressing     Problem: RESPIRATORY - ADULT  Goal: Achieves optimal ventilation and oxygenation  Description  INTERVENTIONS:  - Assess for changes in respiratory status  - Assess for changes in mentation and behavior  - Position to facilitate oxygenation and minimize respiratory effort  - Oxygen administered by appropriate delivery if ordered  - Initiate smoking cessation education as indicated  - Encourage broncho-pulmonary hygiene including cough, deep breathe, Incentive Spirometry  - Assess the need for suctioning and aspirate as needed  - Assess and instruct to report SOB or any respiratory difficulty  - Respiratory Therapy support as indicated  Outcome: Progressing     Problem: GASTROINTESTINAL - ADULT  Goal: Minimal or absence of nausea and/or vomiting  Description  INTERVENTIONS:  - Administer IV fluids if ordered to ensure adequate hydration  - Maintain NPO status until nausea and vomiting are resolved  - Nasogastric tube if ordered  - Administer ordered antiemetic medications as needed  - Provide nonpharmacologic comfort measures as appropriate  - Advance diet as tolerated, if ordered  - Consider nutrition services referral to assist patient with adequate nutrition and appropriate food choices  Outcome: Progressing  Goal: Maintains or returns to baseline bowel function  Description  INTERVENTIONS:  - Assess bowel function  - Encourage oral fluids to ensure adequate hydration  - Administer IV fluids if ordered to ensure adequate hydration  - Administer ordered medications as needed  - Encourage mobilization and activity  - Consider nutritional services referral to assist patient with adequate nutrition and appropriate food choices  Outcome: Progressing  Goal: Maintains adequate nutritional intake  Description  INTERVENTIONS:  - Monitor percentage of each meal consumed  - Identify factors contributing to decreased intake, treat as appropriate  - Assist with meals as needed  - Monitor I&O, weight, and lab values if indicated  - Obtain nutrition services referral as needed  Outcome: Progressing     Problem: GENITOURINARY - ADULT  Goal: Maintains or returns to baseline urinary function  Description  INTERVENTIONS:  - Assess urinary function  - Encourage oral fluids to ensure adequate hydration if ordered  - Administer IV fluids as ordered to ensure adequate hydration  - Administer ordered medications as needed  - Offer frequent toileting  - Follow urinary retention protocol if ordered  Outcome: Progressing  Goal: Absence of urinary retention  Description  INTERVENTIONS:  - Assess patients ability to void and empty bladder  - Monitor I/O  - Bladder scan as needed  - Discuss with physician/AP medications to alleviate retention as needed  - Discuss catheterization for long term situations as appropriate  Outcome: Progressing     Problem: METABOLIC, FLUID AND ELECTROLYTES - ADULT  Goal: Electrolytes maintained within normal limits  Description  INTERVENTIONS:  - Monitor labs and assess patient for signs and symptoms of electrolyte imbalances  - Administer electrolyte replacement as ordered  - Monitor response to electrolyte replacements, including repeat lab results as appropriate  - Instruct patient on fluid and nutrition as appropriate  Outcome: Progressing  Goal: Fluid balance maintained  Description  INTERVENTIONS:  - Monitor labs   - Monitor I/O and WT  - Instruct patient on fluid and nutrition as appropriate  - Assess for signs & symptoms of volume excess or deficit  Outcome: Progressing     Problem: SKIN/TISSUE INTEGRITY - ADULT  Goal: Skin integrity remains intact  Description  INTERVENTIONS  - Identify patients at risk for skin breakdown  - Assess and monitor skin integrity  - Assess and monitor nutrition and hydration status  - Monitor labs (i e  albumin)  - Assess for incontinence   - Turn and reposition patient  - Assist with mobility/ambulation  - Relieve pressure over bony prominences  - Avoid friction and shearing  - Provide appropriate hygiene as needed including keeping skin clean and dry  - Evaluate need for skin moisturizer/barrier cream  - Collaborate with interdisciplinary team (i e  Nutrition, Rehabilitation, etc )   - Patient/family teaching  Outcome: Progressing  Goal: Incision(s), wounds(s) or drain site(s) healing without S/S of infection  Description  INTERVENTIONS  - Assess and document risk factors for skin impairment   - Assess and document dressing, incision, wound bed, drain sites and surrounding tissue  - Consider nutrition services referral as needed  - Oral mucous membranes remain intact  - Provide patient/ family education  Outcome: Progressing  Goal: Oral mucous membranes remain intact  Description  INTERVENTIONS  - Assess oral mucosa and hygiene practices  - Implement preventative oral hygiene regimen  - Implement oral medicated treatments as ordered  - Initiate Nutrition services referral as needed  Outcome: Progressing     Problem: HEMATOLOGIC - ADULT  Goal: Maintains hematologic stability  Description  INTERVENTIONS  - Assess for signs and symptoms of bleeding or hemorrhage  - Monitor labs  - Administer supportive blood products/factors as ordered and appropriate  Outcome: Progressing     Problem: MUSCULOSKELETAL - ADULT  Goal: Maintain or return mobility to safest level of function  Description  INTERVENTIONS:  - Assess patient's ability to carry out ADLs; assess patient's baseline for ADL function and identify physical deficits which impact ability to perform ADLs (bathing, care of mouth/teeth, toileting, grooming, dressing, etc )  - Assess/evaluate cause of self-care deficits   - Assess range of motion  - Assess patient's mobility  - Assess patient's need for assistive devices and provide as appropriate  - Encourage maximum independence but intervene and supervise when necessary  - Involve family in performance of ADLs  - Assess for home care needs following discharge   - Consider OT consult to assist with ADL evaluation and planning for discharge  - Provide patient education as appropriate  Outcome: Progressing  Goal: Maintain proper alignment of affected body part  Description  INTERVENTIONS:  - Support, maintain and protect limb and body alignment  - Provide patient/ family with appropriate education  Outcome: Progressing     Problem: Nutrition/Hydration-ADULT  Goal: Nutrient/Hydration intake appropriate for improving, restoring or maintaining nutritional needs  Description  Monitor and assess patient's nutrition/hydration status for malnutrition   Collaborate with interdisciplinary team and initiate plan and interventions as ordered  Monitor patient's weight and dietary intake as ordered or per policy  Utilize nutrition screening tool and intervene as necessary  Determine patient's food preferences and provide high-protein, high-caloric foods as appropriate       INTERVENTIONS:  - Monitor oral intake, urinary output, labs, and treatment plans  - Assess nutrition and hydration status and recommend course of action  - Evaluate amount of meals eaten  - Assist patient with eating if necessary   - Allow adequate time for meals  - Recommend/ encourage appropriate diets, oral nutritional supplements, and vitamin/mineral supplements  - Order, calculate, and assess calorie counts as needed  - Recommend, monitor, and adjust tube feedings and TPN/PPN based on assessed needs  - Assess need for intravenous fluids  - Provide specific nutrition/hydration education as appropriate  - Include patient/family/caregiver in decisions related to nutrition  Outcome: Progressing

## 2019-12-28 NOTE — ASSESSMENT & PLAN NOTE
Blood pressure elevated upon arrival to the ER and remains elevated today 188/118  She has increased pain S/P fall, and suspect some mild anxiety which may be contributing  Hold HCTZ perioperatively due to risk of SEFERINO  Will give 1 time dose of IV hydralazine once now with manual BP recheck  Continue PRN IV labetalol and PO propranolol  Monitor blood pressure closely

## 2019-12-28 NOTE — ASSESSMENT & PLAN NOTE
She had a fall while exiting her car, was unable to bear weight or stand after the fall  X-ray right hip and CT right hip shows-Nondisplaced fracture of the femoral neck  ER attending discussed case with Dr Agatha Christopher (orthopedic surgery) who is planning for ORIF of the R hip today  Xarelto held since yesterday evening  Pain management PRN  PT/OT postoperatively  Possible STR on discharge, which will likely occur Monday, 12/30/19

## 2019-12-28 NOTE — OP NOTE
OPERATIVE REPORT  PATIENT NAME: Samra Alanis    :  1940  MRN: [id number]  Pt Location: [facility] OR ROOM 02    SURGERY DATE: 2019    Surgeon(s) and Role:     * Anna Mead MD - Primary     * Diana Baltazar PA-C - Assisting no qualified resident available, physician assistant helped medically necessary for patient positioning wound exposure wound closure dressing application all under my direct supervision    Preop Diagnosis:  Closed displaced fracture of right femoral neck ([address] ) [S72 001A]    Post-Op Diagnosis Codes:     * Closed displaced fracture of right femoral neck ([address] ) [S72 001A]    Procedure(s) (LRB):  OPEN REDUCTION W/ INTERNAL FIXATION (ORIF) HIP WITH CANNUALATED SCREWS (Right) Melissa    Specimen(s):  * No specimens in log *    Estimated Blood Loss:   Minimal    Drains:  * No LDAs found *    Anesthesia Type:   General    Operative Indications:  Closed displaced fracture of right femoral neck ([address] ) [S72 001A]  Valgus impacted right femoral  neck fracture    Operative Findings:  Stable valgus impacted fracture  Fixed with 3 cannulated screws    Complications:   None    Procedure and Technique: All treatment options were discussed with the patient including non-operative and operative treatment options  Patient has failed non-perative treatment and has opted for surgical intervention  Risks, complications and benefits of all treatment options were discussed in detail  The risks of surgical intervention including infection, injury to vessels and nerves  risk of failure to achieve desired results, risk of need for further procedures, potential risk of loss of life and limb were discussed with the patient  Informed consent was obtained from the patient  The operative site was marked and signed  A timeout was performed prior to the procedure  The patient was re-identified ,including name, date of birth, procedure, consent form reviewed, site and laterality    Appropriate antibiotics were administered preoperatively    The Physician Assistant was present for the entire case and provided essential assistance with patient positioning, prep and draping, wound closure, sterile dressing and splint application, all under my direct supervision(there was no resident available to assist with this case)    Implant Name Type Inv  Item Serial No   Lot No  LRB No  Used   SCREW MARCI 7 X 90MM 16MM THRD SLF TAP/DRL - [id number]  SCREW MARCI 7 X 90MM 16MM THRD SLF TAP/DRL  Melissa  Right 1   SCREW MARCI 7 X 85MM 16MM THRD SLF TAP/DRL - [id number]  SCREW MARCI 7 X 85MM 16MM THRD SLF TAP/DRL  Melissa  Right 2   SCREW MARCI 7 X 95MM 16MM THRD SLF DRL/TAP - [id number]  SCREW MARCI 7 X 95MM 16MM THRD SLF DRL/TAP  Melissa  Right 1     Patient position on the fracture table with all bony problems well padded    Right hip was prepared and draped in sterile fashion    Fracture is very stable in AP and lateral views with valgus impaction  2 cm incision was made laterally at the entry point just distal to the trochanteric crest  Three guidewires inserted a good position AP and lateral views  Three cannulated screws were inserted over the guidewire with the final hardware position satisfactory fracture held well reduced   I was present for the entire procedure    Patient Disposition:  PACU     SIGNATURE: Mary Jo Craft MD  DATE: December 28, 2019  TIME: 12:00 PM

## 2019-12-28 NOTE — PROGRESS NOTES
Progress Note - [employer] 1940, 78 y o  female MRN: [id number]    Unit/Bed#: 411-01 Encounter: [id number]    Primary Care Provider: George Almonte MD   Date and time admitted to hospital: 12/27/2019  7:34 PM        Closed displaced fracture of right femoral neck ([address] )  Assessment & Plan  She had a fall while exiting her car, was unable to bear weight or stand after the fall  X-ray right hip and CT right hip shows-Nondisplaced fracture of the femoral neck  ER attending discussed case with Dr Jaskaran Morris (orthopedic surgery) who is planning for ORIF of the R hip today  Xarelto held since yesterday evening  Pain management PRN  PT/OT postoperatively  Possible STR on discharge, which will likely occur Monday, 12/30/19  Hypertensive urgency  Assessment & Plan  Blood pressure elevated upon arrival to the ER and remains elevated today 188/118  She has increased pain S/P fall, and suspect some mild anxiety which may be contributing  Hold HCTZ perioperatively due to risk of SEFERINO  Will give 1 time dose of IV hydralazine once now with manual BP recheck  Continue PRN IV labetalol and PO propranolol  Monitor blood pressure closely  Hypokalemia  Assessment & Plan  Potassium mildly decreased 3 4 on admission, now increased to 3 9  She received potassium chloride 40 mg once p o  In the ER  Continue AM BMP  Atrial fibrillation, permanent  Assessment & Plan  Rate currently controlled   Anticoagulated on Xarelto  Held Xarelto since yesterday evening due to planned OR intervention for hip fracture  Plan to restart anticoagulation as soon as possible  Fall from slip, trip, or stumble, initial encounter  Assessment & Plan  Appears mechanical in nature - She reports that she lost her balance while coming out of her car  She states that she was ok prior to fall  She did hit he head sustating a hematoma to right side of her forehead  She denies LOC, Headache, Visual disturbances  Head CT shows-   No acute cranial hemorrhage, mass effect or extra-axial collection  2   Right frontal scalp hematoma   No acute calvarial fracture  CT cervical spine shows - No acute cervical spine fracture or traumatic malalignment  Xray and CT R hip shows radiologic evidence of right femoral neck fracture  Continue Fall precautions  Pain control  PT/OT following planned ORIF R hip today  Acute cystitis without hematuria  Assessment & Plan  UA shows-positive nitrites, 4-10 WBCs, innumerable bacteria with only ocassional epithelial cells  She denies abdominal pain, dysuria  Started on IV cephazolin in the ER  Urine culture pending  Continue IV cefazolin for now  Monitor renal output/renal function  AM labs  Supportive care    Parkinson disease [facility])  Assessment & Plan  Continue Sinemet      VTE Pharmacologic Prophylaxis:   Pharmacologic: Heparin  Mechanical VTE Prophylaxis in Place: Yes      Time Spent for Care: 30 minutes  More than 50% of total time spent on counseling and coordination of care as described above  Current Length of Stay: 1 day(s)    Current Patient Status: Inpatient   Certification Statement: The patient will continue to require additional inpatient hospital stay due to need for planned ORIF right hip  Discharge Plan / Estimated Discharge Date: possibly 19 pending perioperative course  Code Status: Level 3 - DNAR and DNI      Subjective:   Patient's pain is controlled  She denies and CP, SOB  Objective:   Vitals:   Temp (24hrs), Av 7 °F (36 5 °C), Min:97 1 °F (36 2 °C), Max:98 2 °F (36 8 °C)    Temp:  [97 1 °F (36 2 °C)-98 2 °F (36 8 °C)] 98 2 °F (36 8 °C)  HR:  [] 78  Resp:  [16-22] 16  BP: (107-259)/() 188/113  SpO2:  [88 %-98 %] 95 %  Body mass index is 27 54 kg/m²  Input and Output Summary (last 24 hours):      Intake/Output Summary (Last 24 hours) at 2019 0908  Last data filed at 2019 0800  Gross per 24 hour   Intake 105 ml   Output 500 ml   Net -395 ml       Physical Exam:   Physical Exam   Constitutional: She is oriented to person, place, and time  She appears well-developed and well-nourished  No distress  HENT:   Head: Normocephalic  Mouth/Throat: Oropharynx is clear and moist    Neck: Neck supple  Cardiovascular: Normal heart sounds and intact distal pulses  An irregularly irregular rhythm present  No murmur heard  Pulmonary/Chest: Effort normal and breath sounds normal  No respiratory distress  Abdominal: Soft  Bowel sounds are normal  There is no tenderness  Musculoskeletal: She exhibits no edema  Neurovascularly intact RLE with active ROM right ankle  Full exam deferred due to known diagnosis  Neurological: She is alert and oriented to person, place, and time  Abnormal muscle tone:    Skin: Skin is warm and dry  She is not diaphoretic  Psychiatric: She has a normal mood and affect  Nursing note and vitals reviewed  Additional Data:   Labs:    Results from last 7 days   Lab Units 12/28/19  0439   WBC Thousand/uL 7 52   HEMOGLOBIN g/dL 11 5   HEMATOCRIT % 36 2   PLATELETS Thousands/uL 168   NEUTROS PCT % 79*   LYMPHS PCT % 11*   MONOS PCT % 7   EOS PCT % 1     Results from last 7 days   Lab Units 12/28/19  0439   POTASSIUM mmol/L 3 9   CHLORIDE mmol/L 102   CO2 mmol/L 26   BUN mg/dL 17   CREATININE mg/dL 0 81   CALCIUM mg/dL 9 6   ALK PHOS U/L 77   ALT U/L 16   AST U/L 23     Results from last 7 days   Lab Units 12/28/19  0439   INR  1 16       * I Have Reviewed All Lab Data Listed Above  * Additional Pertinent Lab Tests Reviewed: All Labs Within Last 24 Hours Reviewed    Imaging:  Imaging Reports Reviewed Today Include: no new imaging to review          Recent Cultures (last 7 days):           Last 24 Hours Medication List:     Current Facility-Administered Medications:  acetaminophen 650 mg Oral Q6H PRN Jabari Burk PA-C    carbidopa-levodopa 1 tablet Oral TID Jabari Burk PA-C    cefazolin 2,000 mg Intravenous Q8H Jabari ELOY Burk Last Rate: 2,000 mg (12/28/19 0446)   ferrous sulfate 325 mg Oral Daily With Breakfast Jabari Burk PA-C    fish oil 1,000 mg Oral Daily Jabari Burk PA-C    heparin (porcine) 5,000 Units Subcutaneous Q8H [address] Jabari Burk PA-C    iron polysaccharides 150 mg Oral Daily With Breakfast Jabari Burk PA-C    Labetalol HCl 5 mg Intravenous Q4H PRN Jabari Burk PA-C    ondansetron 4 mg Intravenous Q6H PRN Jabari Burk PA-C    oxyCODONE 5 mg Oral Q4H PRN Jabari Burk PA-C    Or        oxyCODONE 10 mg Oral Q4H PRN Jabari Burk PA-C    propranolol 20 mg Oral BID Jabari Burk PA-C    timolol 1 drop Both Eyes BID Jabari Burk PA-C         Today, Patient Was Seen By: Linnea Mccain PA-C    ** Please Note: Dragon 360 Dictation voice to text software may have been used in the creation of this document   **

## 2019-12-28 NOTE — OCCUPATIONAL THERAPY NOTE
Occupational Therapy         Patient Name: Marylee Krauss  ARIELA'NOLAN Date: 12/28/2019    Consult received and chart review performed  Pt  In OR today for R hip repair  Will evaluate tomorrow as able       Sanjuanita Awad OT

## 2019-12-28 NOTE — DISCHARGE INSTRUCTIONS
Discharge Instructions - Orthopedics  Jonathan Coughlin 78 y o  female MRN: [id number]  Unit/Bed#: [facility] OR MAIN    Weight Bearing Status:                                           Weight Bearing as tolerated to left leg  No hip precautions needed    DVT prophylaxis:  Aspirin 325 mg enteric-coated once daily for 30 days  Pain:  Continue analgesics as directed    Showering Instructions:   Do not shower until 3 days    Dressing Instructions:   Keep dressing clean, dry and intact until follow up appointment  Driving Instructions:  No driving until cleared by Orthopaedic Surgery  PT/OT:  Continue PT/OT on outpatient basis as directed    Appt Instructions: If you do not have your appointment, please call the clinic at [phone]  Please schedule follow-up with Dr Fortino Fuller scan be removed anytime after January 8th, 2020  Otherwise followup as scheduled  Contact the office sooner if you experience any increased numbness/tingling in the extremities        Miscellaneous:  Patient develops any redness, fever, draining wound, patient needs to come to the emergency room

## 2019-12-28 NOTE — ASSESSMENT & PLAN NOTE
She had a fall while exiting her car, was unable to bear weight or stand after the fall  X-ray right hip and CT right hip shows-Nondisplaced fracture of the femoral neck  ER attending discussed case with Dr Sis Stark (orthopedic surgery) who is planning for ORIF of the R hip today  Xarelto held since yesterday evening  Pain management PRN  PT/OT postoperatively  Possible STR on discharge, which will likely occur Monday, 12/30/19

## 2019-12-28 NOTE — ANESTHESIA POSTPROCEDURE EVALUATION
Post-Op Assessment Note    CV Status:  Stable  Pain Score: 3       Mental Status:  Arousable   Hydration Status:  Stable   PONV Controlled:  None   Airway Patency:  Patent   Post Op Vitals Reviewed: Yes      Staff: CRNA           BP  131/61   Temp      Pulse  80   Resp   16   SpO2   97

## 2019-12-28 NOTE — ED PROVIDER NOTES
H&P Exam - Trauma   Leonie German 78 y o  female MRN: [id number]  Unit/Bed#: RM01/RM01 Encounter: [id number]    Assessment/Plan   Trauma Alert: Trauma Acuity: Trauma Evaluation  Model of Arrival:   via    Trauma Team: Current Providers  Attending Provider: Gumaro Melendez DO  ED Technician: Cinthya Ruby  Registered Nurse: Sameer Otero RN  ED Technician: Brandee Pedroza  Consultants: Orthopedic surgery; Dr Tyler Hernández    Chief Complaint:   Chief Complaint   Patient presents with   Oscar Strawn Fall     at about [phone] pt states she was getting out of a car when she fell  She bumped her head and her hip  Has bruising at both sites  She currently is taking Carvidopa and Levodopa for Parkinsons and claims she has tremors  History of Present Illness   HPI:  Leonie German is a 78 y o  female who presents with fall when she was getting out of her car at 1730 this evening  States that she uses a cane for balance normally and that while exiting her car, she lost her balance on loose gravel outside her car door, falling to the ground and striking her right hip and right frontal scalp against the ground  No LOC  Unable to arise under her own power secondary to severe pain in the right hip when attempting to do so  Has been unable to bear weight on the right lower extremity since the injury  At present complains only of pain in the right hip  Specifically denies any headache/blurred vision/double vision/neck pain/chest pain/dyspnea/nausea/vomiting/abd pain/extremity weakness/extremity paresthesias  No prior fracture in RLE  Does take xarelto MWF for atrial fibrillation; last dose evening of Wed 25 Dec  Did not take or use anything prior to arrival for pain  Trauma evaluation activated upon patient arrival as she has sustained head injury with anticoagulation  Check CT head/C-spine as well as chest x-ray and right hip x-ray  Disposition pending      Mechanism:Details of Incident: Patient was getting out of her car when she lost her balance when she fell hitting the right side of her head, hurting her right hip and right shin Injury Date: 12/27/19 Injury Time: 1730 [facility]       History provided by:  Patient and medical records    Review of Systems   Eyes: Negative for photophobia and visual disturbance  Respiratory: Negative for cough and shortness of breath  Cardiovascular: Negative for chest pain and palpitations  Gastrointestinal: Negative for abdominal pain, nausea and vomiting  Musculoskeletal: Positive for arthralgias and gait problem  Skin: Positive for wound  Negative for color change, pallor and rash  Neurological: Negative for dizziness, weakness, light-headedness, numbness and headaches  Hematological: Negative for adenopathy  Does not bruise/bleed easily  All other systems reviewed and are negative  Historical Information     Immunizations: There is no immunization history on file for this patient  Past Medical History:   Diagnosis Date    Parkinson's disease ([facility], [address] ) 01/2018     History reviewed  No pertinent family history    Past Surgical History:   Procedure Laterality Date    CARDIAC SURGERY      Pt had stent placed in 2003   [address] History     Socioeconomic History    Marital status: /Civil Union     Spouse name: None    Number of children: None    Years of education: None    Highest education level: None   Occupational History    None   Social Needs    Financial resource strain: None    Food insecurity:     Worry: None     Inability: None    Transportation needs:     Medical: None     Non-medical: None   Tobacco Use    Smoking status: Never Smoker    Smokeless tobacco: Never Used   Substance and Sexual Activity    Alcohol use: Never     Frequency: Never    Drug use: Never    Sexual activity: None   Lifestyle    Physical activity:     Days per week: None Minutes per session: None    Stress: None   Relationships    Social connections:     Talks on phone: None     Gets together: None     Attends Yarsani service: None     Active member of club or organization: None     Attends meetings of clubs or organizations: None     Relationship status: None    Intimate partner violence:     Fear of current or ex partner: None     Emotionally abused: None     Physically abused: None     Forced sexual activity: None   Other Topics Concern    None   Social History Narrative    None       Family History: non-contributory    Meds/Allergies   Prior to Admission Medications   Prescriptions Last Dose Informant Patient Reported? Taking?    Omega-3 Fatty Acids (FISH OIL) 1,000 mg   Yes Yes   Sig: Take 1,000 mg by mouth daily   carbidopa-levodopa (SINEMET)  mg per tablet   Yes Yes   Sig: Take 1 tablet by mouth Three times a day   ergocalciferol (VITAMIN D2) 50,000 units   Yes Yes   Sig: Take 50,000 Units by mouth   ferrous fumarate-iron polysaccharide complex (TANDEM) 162-115 2 MG CAPS   Yes Yes   Sig: Take 1 capsule by mouth daily with breakfast   ferrous sulfate 325 (65 Fe) mg tablet   Yes Yes   Sig: Take 325 mg by mouth daily with breakfast   furosemide (LASIX) 20 mg tablet   Yes Yes   Sig: Take 20 mg by mouth daily   hydrochlorothiazide (MICROZIDE) 12 5 mg capsule   Yes Yes   Sig: Take 12 5 mg by mouth   propranolol (INDERAL) 20 mg tablet   Yes Yes   Sig: Take 20 mg by mouth 2 (two) times a day   rivaroxaban (XARELTO) 20 mg tablet   Yes No   Sig: Take 20 mg by mouth   timolol (BETIMOL) 0 5 % ophthalmic solution   Yes Yes   Si drop 2 (two) times a day      Facility-Administered Medications: None       No Known Allergies    PHYSICAL EXAM    Objective   Vitals:   First set: Temperature: (!) 97 1 °F (36 2 °C) (19)  Pulse: 92 (19)  Respirations: 18 (19)  Blood Pressure: (!) 223/110 (19)  SpO2: 96 % (19)    Primary Survey: (A) Airway:  Intact with normal phonation  (B) Breathing:  Equal bilaterally with no wheeze/crackles/rhonchi  No distress  (C) Circulation: Pulses:   pedal  2/4 and radial  2/4 bilaterally; no external hemorrhage  (D) Disabliity:  GCS Total:  15   Strength 5/5 in upper extremity/lower extremity  Sensation intact to light touch in upper extremity/lower extremity  (E) Expose:  Completed    Secondary Survey:  Physical Exam   Constitutional: She is oriented to person, place, and time  She appears well-developed and well-nourished  She is cooperative  No distress  HENT:   Head: Normocephalic and atraumatic  Right Ear: Hearing, tympanic membrane, external ear and ear canal normal    Left Ear: Hearing, tympanic membrane, external ear and ear canal normal    Nose: Nose normal    Mouth/Throat: Uvula is midline, oropharynx is clear and moist and mucous membranes are normal  No oropharyngeal exudate  Hematoma right frontal scalp approximately 3 centimeters diameter  No bony depression/skull instability  No active bleeding  Eyes: Pupils are equal, round, and reactive to light  Conjunctivae, EOM and lids are normal    Neck: Trachea normal, normal range of motion and phonation normal  Neck supple  No JVD present  No tracheal tenderness, no spinous process tenderness and no muscular tenderness present  No tracheal deviation present  No thyroid mass and no thyromegaly present  Cardiovascular: Normal rate, regular rhythm, S1 normal, S2 normal, normal heart sounds and intact distal pulses  Exam reveals no gallop and no friction rub  No murmur heard  Pulses:       Radial pulses are 2+ on the right side, and 2+ on the left side  Dorsalis pedis pulses are 2+ on the right side, and 2+ on the left side  Posterior tibial pulses are 2+ on the right side, and 2+ on the left side  Pulmonary/Chest: Effort normal and breath sounds normal  No stridor  No respiratory distress   She has no decreased breath sounds  She has no wheezes  She has no rhonchi  She has no rales  She exhibits no tenderness  Abdominal: Soft  She exhibits no distension and no mass  There is no tenderness  There is no rigidity, no rebound, no guarding and no CVA tenderness  Musculoskeletal: Normal range of motion  She exhibits no edema, tenderness or deformity  Thoracic back: Normal         Lumbar back: Normal    No posterior midline T/L-spine tenderness to palpation or step-off    There is moderate ecchymosis and soft tissue swelling over the greater trochanter of the right hip with a small developing hematoma in that area  There is no crepitus in this area  Patient is unable to range the right hip secondary to severe pain in the hip when attempting to do so  There is no tenderness to palpation/deformity in any portion of the rest of the right lower extremity  Neurological: She is alert and oriented to person, place, and time  She has normal strength  No cranial nerve deficit or sensory deficit  She exhibits normal muscle tone  GCS eye subscore is 4  GCS verbal subscore is 5  GCS motor subscore is 6  PERRLA; EOMI  Sensation intact to light touch over face in V1-V3 distribution bilaterally  Facial expressions symmetric  Tongue/uvula midline  Shoulder shrug equal bilaterally  Strength 5/5 in UE/LE bilaterally  Sensation intact to light touch in UE/LE bilaterally  Skin: Skin is warm, dry and intact  No rash noted  She is not diaphoretic  No erythema  Psychiatric: She has a normal mood and affect  Her speech is normal and behavior is normal    Nursing note and vitals reviewed        Invasive Devices     Peripheral Intravenous Line            Peripheral IV 12/27/19 Right Antecubital less than 1 day                Lab Results:   Results Reviewed     Procedure Component Value Units Date/Time    Urine Microscopic [[id number]]  (Abnormal) Collected:  12/27/19 2042    Lab Status:  Final result Specimen:  Urine, Clean Catch Updated: 12/27/19 2056     RBC, UA None Seen /hpf      WBC, UA 4-10 /hpf      Epithelial Cells Occasional /hpf      Bacteria, UA Innumerable /hpf     UA w Reflex to Microscopic w Reflex to Culture [[id number]]  (Abnormal) Collected:  12/27/19 2042    Lab Status:  Final result Specimen:  Urine, Clean Catch Updated:  12/27/19 2049     Color, UA Yellow     Clarity, UA Slightly Cloudy     Specific Gravity, UA 1 025     pH, UA 5 5     Leukocytes, UA Negative     Nitrite, UA Positive     Protein, UA Negative mg/dl      Glucose, UA Negative mg/dl      Ketones, UA Negative mg/dl      Urobilinogen, UA 0 2 E U /dl      Bilirubin, UA Negative     Blood, UA Negative    Basic metabolic panel [[id number]]  (Abnormal) Collected:  12/27/19 2019    Lab Status:  Final result Specimen:  Blood from Arm, Right Updated:  12/27/19 2033     Sodium 138 mmol/L      Potassium 3 4 mmol/L      Chloride 102 mmol/L      CO2 27 mmol/L      ANION GAP 9 mmol/L      BUN 23 mg/dL      Creatinine 0 87 mg/dL      Glucose 95 mg/dL      Calcium 10 1 mg/dL      eGFR 63 ml/min/1 73sq m     Narrative:       [place] guidelines for Chronic Kidney Disease (CKD):     Stage 1 with normal or high GFR (GFR > 90 mL/min/1 73 square meters)    Stage 2 Mild CKD (GFR = 60-89 mL/min/1 73 square meters)    Stage 3A Moderate CKD (GFR = 45-59 mL/min/1 73 square meters)    Stage 3B Moderate CKD (GFR = 30-44 mL/min/1 73 square meters)    Stage 4 Severe CKD (GFR = 15-29 mL/min/1 73 square meters)    Stage 5 End Stage CKD (GFR <15 mL/min/1 73 square meters)  Note: GFR calculation is accurate only with a steady state creatinine    Magnesium [[id number]]  (Normal) Collected:  12/27/19 2019    Lab Status:  Final result Specimen:  Blood from Arm, Right Updated:  12/27/19 2033     Magnesium 1 9 mg/dL     Protime-INR [[id number]]  (Normal) Collected:  12/27/19 2019    Lab Status:  Final result Specimen:  Blood from Arm, Right Updated:  12/27/19 2032     Protime 13 7 seconds      INR 1 05    CBC and differential [[id number]] Collected:  12/27/19 2019    Lab Status:  Final result Specimen:  Blood from Arm, Right Updated:  12/27/19 2024     WBC 6 51 Thousand/uL      RBC 4 18 Million/uL      Hemoglobin 12 0 g/dL      Hematocrit 38 1 %      MCV 91 fL      MCH 28 7 pg      MCHC 31 5 g/dL      RDW 14 9 %      MPV 9 9 fL      Platelets 322 Thousands/uL      nRBC 0 /100 WBCs      Neutrophils Relative 68 %      Immat GRANS % 1 %      Lymphocytes Relative 21 %      Monocytes Relative 7 %      Eosinophils Relative 2 %      Basophils Relative 1 %      Neutrophils Absolute 4 45 Thousands/µL      Immature Grans Absolute 0 07 Thousand/uL      Lymphocytes Absolute 1 37 Thousands/µL      Monocytes Absolute 0 45 Thousand/µL      Eosinophils Absolute 0 13 Thousand/µL      Basophils Absolute 0 04 Thousands/µL                  Imaging Studies:     CT chest with contrast   Final Result by Agueda Bejarano DO (12/27 2324)      No discrete acute process seen in the chest       Fusiform aneurysmal dilatation of the ascending aorta which measures approximately 4 2 cm in maximal dimensions  The aorta is tortuous but no acute aortic process is seen  Predominantly oval-shaped but irregular lesion in the inferior lateral aspect of the left breast which measures approximately 4 5 x 4 0 x 4 1 cm in size  This is best appreciated on axial image 32, series 2  There is some surrounding fat    stranding/edema in the left breast    A component of this may be posttraumatic or related to prior intervention given the patient's history, however correlation with the patient's physical exam as well as follow-up imaging including    mammography/ultrasound could be considered to confirm resolution and exclude underlying breast lesion at the discretion of the referring caregiver  Cardiomegaly, coronary and aortic atherosclerosis, small hiatal hernia, and other findings as above        Findings discussed with Dr Camelia Abreu by Dr Anupama Pride at 11:20 PM on 12/27/2019  Workstation performed: [id number]         CT hip right without contrast   Final Result by Ladarius Berry DO (12/27 2236)      Nondisplaced fracture of the femoral neck as described above  The study was marked in [facility] for immediate notification  Workstation performed: [id number]         CT spine cervical wo contrast   Final Result by Amalia Chung MD (12/27 2052)      No acute cervical spine fracture or traumatic malalignment  Workstation performed: [id number]         CT head wo contrast   Final Result by Amalia Chung MD (12/27 2056)         1  No acute cranial hemorrhage, mass effect or extra-axial collection  2   Right frontal scalp hematoma  No acute calvarial fracture  Workstation performed: [id number]         XR chest 1 view portable   ED Interpretation by Madeleine Ocasio DO (12/27 2104)   Airway is midline  Lungs are clear bilaterally with no evidence of pulmonary vascular congestion/focal infiltrate/pleural effusion//pneumothorax  Cardiac and mediastinal silhouettes are within normal limits  Osseous structures appear normal       XR hip/pelv 2-3 vws right if performed   ED Interpretation by Madeleine Ocasio DO (12/27 2104)   Pelvic rings are intact  There is a minimally displaced right femoral neck fracture  Procedures  Procedures         ED Course  ED Course as of Dec 27 2325   Fri Dec 27, 2019   2051 CTs completed and awaiting interpretation  R femoral neck fracture is present    1  WBC wnl   2  Hg/Hct wnl   3  Plt wnl   4  Electrolytes: mild hypokalemia; otherwise wnl   5  INR wnl   6  UA: positive nitrite; otherwise negative  Will treat for UTI in setting of fall/R hip fracture requiring intervention          2101 CT head wo contrast   2103 CT spine cervical wo contrast   2110 Patient apprised of results of right hip fracture and need for surgical intervention  She is in agreement with this plan  D/w Dr Louisa Hurtado of orthopedic surgery  He is on-call this weekend and can perform the procedure at this facility on [place] 29 Dec or Mon 30 Dec   Requested CT right hip for preoperative planning  Ortho consult placed in [facility]  Admit to [facility]    Patient is notably hypoxemic to 93-94 percent consistently on room air since approximately 2030  This was not present at arrival   She is not in any respiratory distress  She speaks in full sentences  She is not using any accessory muscles  I will obtain a CT of the chest in addition to the CT of the right hip  2134 D/w [facility] MIKE Barboza  Patient case discussed  Accepts under care of Dr Walden Area to inpatient admission  A bed request order was held until CT chest result was obtained      2239 Femoral neck fracture  A hematoma of the soft tissue is present c/w the physical exam findings   CT hip right without contrast   2319 D/w Dr Katt Monte of radiology  No acute findings on CT chest  Coronary atherosclerosis  4 5 cm fluid collection of L chest wall c/w patient's recent breast mass excision: patient reports a hematoma of the left chest that lasted several weeks but has since resolved  She specifically denied any chest wall injury from the fall today  [facility] MIKE Montesinos was updated            MDM      Disposition    Final diagnoses:   Fall with injury, initial encounter   Closed displaced fracture of right femoral neck ([address] )   Contusion of head     Time reflects when diagnosis was documented in both MDM as applicable and the Disposition within this note     Time User Action Codes Description Comment    12/27/2019 10:11 PM Marisol Alexandra Add [C87  XEPP] Fall with injury, initial encounter     12/27/2019 10:11 PM Marisol Alexandra Add [S72 001A] Closed displaced fracture of right femoral neck ([address] )     12/27/2019 10:11 PM Marisol Alexandra Add [[id number]] Contusion of head       ED Disposition     ED Disposition Condition Date/Time Comment Admit Stable Fri Dec 27, 2019 11:23 PM Case was discussed with DANIELA Barboza and the patient's admission status was agreed to be Admission Status: inpatient status to the service of Dr Sarah Cuadra   Follow-up Information    None       Patient's Medications   Discharge Prescriptions    No medications on file     No discharge procedures on file        ED Provider  Electronically Signed by         Yair Kearney DO  12/28/19 0898

## 2019-12-28 NOTE — PLAN OF CARE
Problem: Potential for Falls  Goal: Patient will remain free of falls  Description  INTERVENTIONS:  - Assess patient frequently for physical needs  -  Identify cognitive and physical deficits and behaviors that affect risk of falls    -  [place] fall precautions as indicated by assessment   - Educate patient/family on patient safety including physical limitations  - Instruct patient to call for assistance with activity based on assessment  - Modify environment to reduce risk of injury  - Consider OT/PT consult to assist with strengthening/mobility  Outcome: Progressing     Problem: Prexisting or High Potential for Compromised Skin Integrity  Goal: Skin integrity is maintained or improved  Description  INTERVENTIONS:  - Identify patients at risk for skin breakdown  - Assess and monitor skin integrity  - Assess and monitor nutrition and hydration status  - Monitor labs   - Assess for incontinence   - Turn and reposition patient  - Assist with mobility/ambulation  - Relieve pressure over bony prominences  - Avoid friction and shearing  - Provide appropriate hygiene as needed including keeping skin clean and dry  - Evaluate need for skin moisturizer/barrier cream  - Collaborate with interdisciplinary team   - Patient/family teaching  - Consider wound care consult   Outcome: Progressing     Problem: RESPIRATORY - ADULT  Goal: Achieves optimal ventilation and oxygenation  Description  INTERVENTIONS:  - Assess for changes in respiratory status  - Assess for changes in mentation and behavior  - Position to facilitate oxygenation and minimize respiratory effort  - Oxygen administered by appropriate delivery if ordered  - Initiate smoking cessation education as indicated  - Encourage broncho-pulmonary hygiene including cough, deep breathe, Incentive Spirometry  - Assess the need for suctioning and aspirate as needed  - Assess and instruct to report SOB or any respiratory difficulty  - Respiratory Therapy support as indicated  Outcome: Progressing     Problem: GASTROINTESTINAL - ADULT  Goal: Minimal or absence of nausea and/or vomiting  Description  INTERVENTIONS:  - Administer IV fluids if ordered to ensure adequate hydration  - Maintain NPO status until nausea and vomiting are resolved  - Nasogastric tube if ordered  - Administer ordered antiemetic medications as needed  - Provide nonpharmacologic comfort measures as appropriate  - Advance diet as tolerated, if ordered  - Consider nutrition services referral to assist patient with adequate nutrition and appropriate food choices  Outcome: Progressing  Goal: Maintains or returns to baseline bowel function  Description  INTERVENTIONS:  - Assess bowel function  - Encourage oral fluids to ensure adequate hydration  - Administer IV fluids if ordered to ensure adequate hydration  - Administer ordered medications as needed  - Encourage mobilization and activity  - Consider nutritional services referral to assist patient with adequate nutrition and appropriate food choices  Outcome: Progressing  Goal: Maintains adequate nutritional intake  Description  INTERVENTIONS:  - Monitor percentage of each meal consumed  - Identify factors contributing to decreased intake, treat as appropriate  - Assist with meals as needed  - Monitor I&O, weight, and lab values if indicated  - Obtain nutrition services referral as needed  Outcome: Progressing     Problem: GENITOURINARY - ADULT  Goal: Maintains or returns to baseline urinary function  Description  INTERVENTIONS:  - Assess urinary function  - Encourage oral fluids to ensure adequate hydration if ordered  - Administer IV fluids as ordered to ensure adequate hydration  - Administer ordered medications as needed  - Offer frequent toileting  - Follow urinary retention protocol if ordered  Outcome: Progressing  Goal: Absence of urinary retention  Description  INTERVENTIONS:  - Assess patients ability to void and empty bladder  - Monitor I/O  - Bladder scan as needed  - Discuss with physician/AP medications to alleviate retention as needed  - Discuss catheterization for long term situations as appropriate  Outcome: Progressing     Problem: METABOLIC, FLUID AND ELECTROLYTES - ADULT  Goal: Electrolytes maintained within normal limits  Description  INTERVENTIONS:  - Monitor labs and assess patient for signs and symptoms of electrolyte imbalances  - Administer electrolyte replacement as ordered  - Monitor response to electrolyte replacements, including repeat lab results as appropriate  - Instruct patient on fluid and nutrition as appropriate  Outcome: Progressing  Goal: Fluid balance maintained  Description  INTERVENTIONS:  - Monitor labs   - Monitor I/O and WT  - Instruct patient on fluid and nutrition as appropriate  - Assess for signs & symptoms of volume excess or deficit  Outcome: Progressing     Problem: SKIN/TISSUE INTEGRITY - ADULT  Goal: Skin integrity remains intact  Description  INTERVENTIONS  - Identify patients at risk for skin breakdown  - Assess and monitor skin integrity  - Assess and monitor nutrition and hydration status  - Monitor labs (i e  albumin)  - Assess for incontinence   - Turn and reposition patient  - Assist with mobility/ambulation  - Relieve pressure over bony prominences  - Avoid friction and shearing  - Provide appropriate hygiene as needed including keeping skin clean and dry  - Evaluate need for skin moisturizer/barrier cream  - Collaborate with interdisciplinary team (i e  Nutrition, Rehabilitation, etc )   - Patient/family teaching  Outcome: Progressing  Goal: Incision(s), wounds(s) or drain site(s) healing without S/S of infection  Description  INTERVENTIONS  - Assess and document risk factors for skin impairment   - Assess and document dressing, incision, wound bed, drain sites and surrounding tissue  - Consider nutrition services referral as needed  - Oral mucous membranes remain intact  - Provide patient/ family education  Outcome: Progressing  Goal: Oral mucous membranes remain intact  Description  INTERVENTIONS  - Assess oral mucosa and hygiene practices  - Implement preventative oral hygiene regimen  - Implement oral medicated treatments as ordered  - Initiate Nutrition services referral as needed  Outcome: Progressing     Problem: HEMATOLOGIC - ADULT  Goal: Maintains hematologic stability  Description  INTERVENTIONS  - Assess for signs and symptoms of bleeding or hemorrhage  - Monitor labs  - Administer supportive blood products/factors as ordered and appropriate  Outcome: Progressing     Problem: MUSCULOSKELETAL - ADULT  Goal: Maintain or return mobility to safest level of function  Description  INTERVENTIONS:  - Assess patient's ability to carry out ADLs; assess patient's baseline for ADL function and identify physical deficits which impact ability to perform ADLs (bathing, care of mouth/teeth, toileting, grooming, dressing, etc )  - Assess/evaluate cause of self-care deficits   - Assess range of motion  - Assess patient's mobility  - Assess patient's need for assistive devices and provide as appropriate  - Encourage maximum independence but intervene and supervise when necessary  - Involve family in performance of ADLs  - Assess for home care needs following discharge   - Consider OT consult to assist with ADL evaluation and planning for discharge  - Provide patient education as appropriate  Outcome: Progressing  Goal: Maintain proper alignment of affected body part  Description  INTERVENTIONS:  - Support, maintain and protect limb and body alignment  - Provide patient/ family with appropriate education  Outcome: Progressing

## 2019-12-29 LAB
ANION GAP SERPL CALCULATED.3IONS-SCNC: 9 MMOL/L (ref 4–13)
BASOPHILS # BLD AUTO: 0.03 THOUSANDS/ΜL (ref 0–0.1)
BASOPHILS NFR BLD AUTO: 0 % (ref 0–1)
BUN SERPL-MCNC: 16 MG/DL (ref 5–25)
CALCIUM SERPL-MCNC: 9.8 MG/DL (ref 8.3–10.1)
CHLORIDE SERPL-SCNC: 101 MMOL/L (ref 100–108)
CO2 SERPL-SCNC: 24 MMOL/L (ref 21–32)
CREAT SERPL-MCNC: 0.87 MG/DL (ref 0.6–1.3)
EOSINOPHIL # BLD AUTO: 0 THOUSAND/ΜL (ref 0–0.61)
EOSINOPHIL NFR BLD AUTO: 0 % (ref 0–6)
ERYTHROCYTE [DISTWIDTH] IN BLOOD BY AUTOMATED COUNT: 14.7 % (ref 11.6–15.1)
GFR SERPL CREATININE-BSD FRML MDRD: 63 ML/MIN/1.73SQ M
GLUCOSE SERPL-MCNC: 129 MG/DL (ref 65–140)
HCT VFR BLD AUTO: 35.8 % (ref 34.8–46.1)
HGB BLD-MCNC: 11.3 G/DL (ref 11.5–15.4)
IMM GRANULOCYTES # BLD AUTO: 0.06 THOUSAND/UL (ref 0–0.2)
IMM GRANULOCYTES NFR BLD AUTO: 1 % (ref 0–2)
LYMPHOCYTES # BLD AUTO: 0.72 THOUSANDS/ΜL (ref 0.6–4.47)
LYMPHOCYTES NFR BLD AUTO: 8 % (ref 14–44)
MCH RBC QN AUTO: 28.1 PG (ref 26.8–34.3)
MCHC RBC AUTO-ENTMCNC: 31.6 G/DL (ref 31.4–37.4)
MCV RBC AUTO: 89 FL (ref 82–98)
MONOCYTES # BLD AUTO: 0.67 THOUSAND/ΜL (ref 0.17–1.22)
MONOCYTES NFR BLD AUTO: 7 % (ref 4–12)
NEUTROPHILS # BLD AUTO: 7.73 THOUSANDS/ΜL (ref 1.85–7.62)
NEUTS SEG NFR BLD AUTO: 84 % (ref 43–75)
NRBC BLD AUTO-RTO: 0 /100 WBCS
PLATELET # BLD AUTO: 178 THOUSANDS/UL (ref 149–390)
PMV BLD AUTO: 10.5 FL (ref 8.9–12.7)
POTASSIUM SERPL-SCNC: 3.8 MMOL/L (ref 3.5–5.3)
RBC # BLD AUTO: 4.02 MILLION/UL (ref 3.81–5.12)
SODIUM SERPL-SCNC: 134 MMOL/L (ref 136–145)
WBC # BLD AUTO: 9.21 THOUSAND/UL (ref 4.31–10.16)

## 2019-12-29 PROCEDURE — 97530 THERAPEUTIC ACTIVITIES: CPT | Performed by: DEVELOPMENTAL THERAPIST

## 2019-12-29 PROCEDURE — G8988 SELF CARE GOAL STATUS: HCPCS | Performed by: DEVELOPMENTAL THERAPIST

## 2019-12-29 PROCEDURE — 99232 SBSQ HOSP IP/OBS MODERATE 35: CPT | Performed by: PHYSICIAN ASSISTANT

## 2019-12-29 PROCEDURE — 85025 COMPLETE CBC W/AUTO DIFF WBC: CPT | Performed by: ORTHOPAEDIC SURGERY

## 2019-12-29 PROCEDURE — 99024 POSTOP FOLLOW-UP VISIT: CPT | Performed by: PHYSICIAN ASSISTANT

## 2019-12-29 PROCEDURE — 97166 OT EVAL MOD COMPLEX 45 MIN: CPT | Performed by: DEVELOPMENTAL THERAPIST

## 2019-12-29 PROCEDURE — G8987 SELF CARE CURRENT STATUS: HCPCS | Performed by: DEVELOPMENTAL THERAPIST

## 2019-12-29 PROCEDURE — 80048 BASIC METABOLIC PNL TOTAL CA: CPT | Performed by: ORTHOPAEDIC SURGERY

## 2019-12-29 RX ORDER — ASPIRIN 81 MG/1
81 TABLET, CHEWABLE ORAL SEE ADMIN INSTRUCTIONS
COMMUNITY
End: 2019-12-31 | Stop reason: HOSPADM

## 2019-12-29 RX ORDER — HYDROCHLOROTHIAZIDE 12.5 MG/1
12.5 TABLET ORAL 2 TIMES DAILY
Status: DISCONTINUED | OUTPATIENT
Start: 2019-12-29 | End: 2019-12-31 | Stop reason: HOSPADM

## 2019-12-29 RX ADMIN — HEPARIN SODIUM 5000 UNITS: 5000 INJECTION INTRAVENOUS; SUBCUTANEOUS at 05:14

## 2019-12-29 RX ADMIN — HYDROCHLOROTHIAZIDE 12.5 MG: 12.5 TABLET ORAL at 09:43

## 2019-12-29 RX ADMIN — CEFAZOLIN SODIUM 2000 MG: 2 SOLUTION INTRAVENOUS at 04:29

## 2019-12-29 RX ADMIN — PROPRANOLOL HYDROCHLORIDE 20 MG: 20 TABLET ORAL at 17:25

## 2019-12-29 RX ADMIN — RIVAROXABAN 20 MG: 10 TABLET, FILM COATED ORAL at 10:12

## 2019-12-29 RX ADMIN — FERROUS SULFATE TAB 325 MG (65 MG ELEMENTAL FE) 325 MG: 325 (65 FE) TAB at 09:25

## 2019-12-29 RX ADMIN — CARBIDOPA AND LEVODOPA 1 TABLET: 25; 100 TABLET ORAL at 21:08

## 2019-12-29 RX ADMIN — CEFAZOLIN SODIUM 2000 MG: 2 SOLUTION INTRAVENOUS at 21:09

## 2019-12-29 RX ADMIN — POLYSACCHARIDE-IRON COMPLEX 150 MG: 150 CAPSULE ORAL at 09:28

## 2019-12-29 RX ADMIN — PROPRANOLOL HYDROCHLORIDE 20 MG: 20 TABLET ORAL at 09:25

## 2019-12-29 RX ADMIN — TIMOLOL MALEATE 1 DROP: 5 SOLUTION/ DROPS OPHTHALMIC at 09:25

## 2019-12-29 RX ADMIN — CEFAZOLIN SODIUM 2000 MG: 2 SOLUTION INTRAVENOUS at 14:02

## 2019-12-29 RX ADMIN — CARBIDOPA AND LEVODOPA 1 TABLET: 25; 100 TABLET ORAL at 09:26

## 2019-12-29 RX ADMIN — TIMOLOL MALEATE 1 DROP: 5 SOLUTION/ DROPS OPHTHALMIC at 17:29

## 2019-12-29 RX ADMIN — CARBIDOPA AND LEVODOPA 1 TABLET: 25; 100 TABLET ORAL at 17:25

## 2019-12-29 RX ADMIN — OMEGA-3 FATTY ACIDS CAP 1000 MG 1000 MG: 1000 CAP at 09:26

## 2019-12-29 RX ADMIN — HYDROCHLOROTHIAZIDE 12.5 MG: 12.5 TABLET ORAL at 17:25

## 2019-12-29 NOTE — PLAN OF CARE
Problem: OCCUPATIONAL THERAPY ADULT  Goal: Performs self-care activities at highest level of function for planned discharge setting  See evaluation for individualized goals  Description  Treatment Interventions: ADL retraining, Functional transfer training, UE strengthening/ROM, Endurance training, Patient/family training, Equipment evaluation/education, Activityengagement          See flowsheet documentation for full assessment, interventions and recommendations  Outcome: Progressing  Note:   Limitation: Decreased ADL status, Decreased endurance, Decreased self-care trans, Decreased high-level ADLs  Prognosis: Good  Assessment: Pt is a 78 y o  female seen for OT evaluation s/p admit to [facility] on 12/27/2019 w/ Closed displaced fracture of right femoral neck ([facility] [address] )  Comorbidities affecting pt's functional performance at time of assessment include: Parkinson's Disease, A-fib, see above    Personal factors affecting pt at time of IE include:limited home support  Prior to admission, pt was I with ADLs/IADLs and functional mobility using SPC  Upon evaluation: Pt requires max a for LB ADLs, mod a x1 for bed mobility, and min a x1 with rw for functional transfers/mobility  2* the following deficits impacting occupational performance: weakness, decreased strength, decreased balance and increased pain  Pt to benefit from continued skilled OT tx while in the hospital to address deficits as defined above and maximize level of functional independence w ADL's and functional mobility  Occupational Performance areas to address include: grooming, bathing/shower, toilet hygiene, dressing and functional mobility   From OT standpoint, recommendation at time of d/c would be short term rehab stay prior to returning home     OT Discharge Recommendation: Short Term Rehab  OT - OK to Discharge: Yes(When patient is medcially stable)

## 2019-12-29 NOTE — ASSESSMENT & PLAN NOTE
Potassium mildly decreased 3 4 on admission, now resolved  She received potassium chloride 40 mg once p o  In the ER  Continue AM BMP monitoring  Luis E Link

## 2019-12-29 NOTE — PROGRESS NOTES
Progress Note - Sanya Aguiar 1940, 78 y o  female MRN: [id number]    Unit/Bed#: 411-01 Encounter: [id number]    Primary Care Provider: Jan Chery MD   Date and time admitted to hospital: 12/27/2019  7:34 PM        * Closed displaced fracture of right femoral neck ([address] )  Assessment & Plan  She had a fall while exiting her car, was unable to bear weight or stand after the fall  X-ray right hip and CT right hip shows-Nondisplaced fracture of the femoral neck  ER attending discussed case with Dr Enedina Lopez (orthopedic surgery) and patient underwent ORIF of the right hip on 12/28/19  Xarelto held perioperatively, will restart today     Continue Pain management PRN  PT/OT -  would likely benefit from short-term rehab on discharge vs  Possible home PT / outpatient PT  PT to reassess Monday, 12/30/19  Hypertensive urgency  Assessment & Plan  Blood pressure elevated upon arrival to the ER and remains elevated yesterday 188/118  Now improved at 168/84 today  She has increased pain S/P fall, and suspect some mild anxiety which may be contributing  Held HCTZ perioperatively due to risk of SEFERINO, however will restart this morning  Continue PRN IV labetalol and PO propranolol  Monitor blood pressure closely  Consider adding additional antihypertensive agent if BP remains elevated  Hypokalemia  Assessment & Plan  Potassium mildly decreased 3 4 on admission, now resolved  She received potassium chloride 40 mg once p o  In the ER  Continue AM BMP monitoring       Atrial fibrillation, permanent  Assessment & Plan  Rate currently controlled   Anticoagulated on Xarelto but at modified dosing of 3 times weekly with aspirin alternated in between due to cost concerns  Held Xarelto perioperatively due to planned OR intervention for hip fracture, restarted today    Would recommend continuing Xarelto daily at least in the short term not only due to A fib, but also for adequate DVT prophylaxis in the setting of ORIF R hip  Patient is agreeable and will discuss further with short term rehab attending and orthopedics  Ideally she should continue daily Xarelto dosing until seen by Orthopedics in the office postoperatively  Fall from slip, trip, or stumble, initial encounter  Assessment & Plan  Appears mechanical in nature - She reports that she lost her balance while coming out of her car  She states that she was ok prior to fall  She did hit he head sustating a hematoma to right side of her forehead  She denies LOC, Headache, Visual disturbances  Head CT shows-   No acute cranial hemorrhage, mass effect or extra-axial collection  2   Right frontal scalp hematoma   No acute calvarial fracture  CT cervical spine shows - No acute cervical spine fracture or traumatic malalignment  Xray and CT R hip shows radiologic evidence of right femoral neck fracture  Continue Fall precautions  Pain control  PT/OT - recommending short term rehab  Acute cystitis without hematuria  Assessment & Plan  UA shows-positive nitrites, 4-10 WBCs, innumerable bacteria with only ocassional epithelial cells  She denies abdominal pain, dysuria, hematuria, or urinary frequency or urgency  Urine culture showing >100,000 colony count gram negative rods  Continue IV cefazolin for now, will plan for short course of treatment given lack of symptoms  Monitor renal output/renal function  AM labs  Supportive care    Parkinson disease [facility])  Assessment & Plan  Continue Sinemet  VTE Pharmacologic Prophylaxis:   Pharmacologic: Rivaroxaban (Xarelto)  Mechanical VTE Prophylaxis in Place: Yes    Patient Centered Rounds: I have performed bedside rounds with nursing staff today  Time Spent for Care: 30 minutes  More than 50% of total time spent on counseling and coordination of care as described above      Current Length of Stay: 2 day(s)    Current Patient Status: Inpatient   Certification Statement: The patient will continue to require additional inpatient hospital stay due to need for PT/OT, IV antibiotics  Discharge Plan / Estimated Discharge Date: STR referrals pending, possible D/c tomorrow  Code Status: Level 3 - DNAR and DNI      Subjective:   Patient is feeling well today  Minimal pain of the right hip  No CP, SOB, N/V  Objective:   Vitals:   Temp (24hrs), Av 2 °F (36 8 °C), Min:97 3 °F (36 3 °C), Max:98 8 °F (37 1 °C)    Temp:  [97 3 °F (36 3 °C)-98 8 °F (37 1 °C)] 98 4 °F (36 9 °C)  HR:  [72-96] 89  Resp:  [16-21] 16  BP: ()/(56-84) 168/84  SpO2:  [94 %-100 %] 97 %  Body mass index is 27 78 kg/m²  Input and Output Summary (last 24 hours): Intake/Output Summary (Last 24 hours) at 2019 1136  Last data filed at 2019 7735  Gross per 24 hour   Intake 1200 ml   Output 1260 ml   Net -60 ml       Physical Exam:     Physical Exam   Constitutional: She is oriented to person, place, and time  She appears well-developed and well-nourished  No distress  HENT:   Head: Normocephalic  Mouth/Throat: Oropharynx is clear and moist    Neck: Neck supple  Cardiovascular: Normal rate, regular rhythm and normal heart sounds  No murmur heard  Pulmonary/Chest: Effort normal and breath sounds normal  She has no wheezes  Abdominal: Soft  Bowel sounds are normal  There is no tenderness  Musculoskeletal: She exhibits no edema  Neurological: She is alert and oriented to person, place, and time  Skin: Skin is warm and dry  She is not diaphoretic  Psychiatric: She has a normal mood and affect  Nursing note and vitals reviewed          Additional Data:     Labs:    Results from last 7 days   Lab Units 19  0436   WBC Thousand/uL 9 21   HEMOGLOBIN g/dL 11 3*   HEMATOCRIT % 35 8   PLATELETS Thousands/uL 178   NEUTROS PCT % 84*   LYMPHS PCT % 8*   MONOS PCT % 7   EOS PCT % 0     Results from last 7 days   Lab Units 19  0436 19  0439   POTASSIUM mmol/L 3 8 3 9   CHLORIDE mmol/L 101 102   CO2 mmol/L 24 26   BUN mg/dL 16 17   CREATININE mg/dL 0 87 0 81   CALCIUM mg/dL 9 8 9 6   ALK PHOS U/L  --  77   ALT U/L  --  16   AST U/L  --  23     Results from last 7 days   Lab Units 12/28/19  0439   INR  1 16       * I Have Reviewed All Lab Data Listed Above  * Additional Pertinent Lab Tests Reviewed: All Labs Within Last 24 Hours Reviewed    Imaging:  Imaging Reports Reviewed Today Include: no new imaging to review  Recent Cultures (last 7 days):     Results from last 7 days   Lab Units 12/27/19 2042   URINE CULTURE  >100,000 cfu/ml Gram Negative Eric Enteric Like*       Last 24 Hours Medication List:     Current Facility-Administered Medications:  acetaminophen 650 mg Oral Q6H PRN Anna Mead MD    carbidopa-levodopa 1 tablet Oral TID Anna Mead MD    cefazolin 2,000 mg Intravenous Q8H Anna Mead MD Last Rate: 2,000 mg (12/29/19 0429)   ferrous sulfate 325 mg Oral Daily With Breakfast Anna Mead MD    fish oil 1,000 mg Oral Daily Anna Mead MD    hydrochlorothiazide 12 5 mg Oral BID Elana Bedoya PA-C    iron polysaccharides 150 mg Oral Daily With Breakfast Anna Mead MD    Labetalol HCl 5 mg Intravenous Q4H PRN Anna Mead MD    ondansetron 4 mg Intravenous Q6H PRN Anna Mead MD    oxyCODONE 5 mg Oral Q4H PRN Anna Mead MD    Or        oxyCODONE 10 mg Oral Q4H PRN Anna Mead MD    propranolol 20 mg Oral BID Anna Mead MD    rivaroxaban 20 mg Oral Daily With Breakfast Nelson De Los Santos PA-C    timolol 1 drop Both Eyes BID Anna Mead MD         Today, Patient Was Seen By: Elana Bedoya PA-C    ** Please Note: Dragon 360 Dictation voice to text software may have been used in the creation of this document   **

## 2019-12-29 NOTE — ASSESSMENT & PLAN NOTE
She had a fall while exiting her car, was unable to bear weight or stand after the fall  X-ray right hip and CT right hip shows-Nondisplaced fracture of the femoral neck  ER attending discussed case with Dr Gonzalez Stone (orthopedic surgery) and patient underwent ORIF of the right hip on 12/28/19  Xarelto held perioperatively, will restart today     Continue Pain management PRN  PT/OT -  would likely benefit from short-term rehab on discharge vs  Possible home PT / outpatient PT  PT to reassess Monday, 12/30/19

## 2019-12-29 NOTE — OCCUPATIONAL THERAPY NOTE
[address] Evaluation     Patient Name: Adolph Marina  ZULKG'F Date: 12/29/2019  Problem List  Principal Problem:    Closed displaced fracture of right femoral neck ([facility] [address] )  Active Problems:    Hypertensive urgency    [facility] ([facility] )    Acute cystitis without hematuria    Fall from slip, trip, or stumble, initial encounter    Atrial fibrillation, permanent    Hypokalemia    Past Medical History  Past Medical History:   Diagnosis Date    Parkinson's disease ([facility] ) 01/2018     Past Surgical History  Past Surgical History:   Procedure Laterality Date    CARDIAC SURGERY      Pt had stent placed in 2003   [address]           12/29/19 0984   Note Type   Note type Eval/Treat   Restrictions/Precautions   Weight Bearing Precautions Per Order Yes   RLE Weight Bearing Per Order WBAT   Other Precautions Fall Risk; Chair Alarm; Bed Alarm   Pain Assessment   Pain Assessment 0-10   Pain Score 3   Pain Type Surgical pain   Pain Location Hip   Pain Orientation Right   Home Living   Type of [address] [place] [address] One level; Able to live on main level with bedroom/bathroom; Performs ADLs on one level;Ramped entrance   Bathroom Shower/Tub Tub/shower unit  (Pt  uses tub/doesn't typically shower)   Bathroom Toilet Standard   Bathroom Accessibility Accessible   Home Equipment Walker;Cane  (rollator)   Prior Function   Level of [place] Independent with ADLs and functional mobility   Lives With Spouse   Receives Help From Family   ADL Assistance Independent   IADLs Independent   Falls in the last 6 months 1 to 4   Vocational Retired   Comments Pt  ambulates with SPC at baseline; drives    Lifestyle   Autonomy Pt  is I with ADLs/IADLs and functional mobility with SPC   Reciprocal Relationships Pt  resides in a 1 story home with     Subjective   Subjective "When do you think I can go home?"   ADL   LB Dressing Assistance 2  Maximal Assistance   LB Dressing Deficit Don/doff R sock;Don/doff L sock   Bed Mobility   Supine to Sit 3  Moderate assistance   Additional items Assist x 1;Verbal cues   Sit to Supine   (Pt  OOB in bedside chair at end of session)   Transfers   Sit to Stand 4  Minimal assistance   Additional items Assist x 1;Verbal cues   Stand to Sit 4  Minimal assistance   Additional items Assist x 1;Verbal cues   Stand pivot 4  Minimal assistance   Additional items Assist x 1;Verbal cues   Functional Mobility   Additional Comments Pt  performed stand pivot transfer to bedside chair with min a x1 and rw  Balance   Static Sitting Good   Dynamic Sitting Good   Static Standing Fair   Dynamic Standing Fair   Ambulatory Fair   Activity Tolerance   Activity Tolerance Patient limited by pain   RUE Assessment   RUE Assessment WFL  (Strength 4/5 grossly)   LUE Assessment   LUE Assessment WFL  (Strength 4/5 grossly)   Hand Function   Gross Motor Coordination Functional   Fine Motor Coordination Functional  (Pt  has PD )   Cognition   Overall Cognitive Status WFL   Arousal/Participation Alert; Cooperative   Attention Within functional limits   Orientation Level Oriented X4   Memory Within functional limits   Following Commands Follows all commands and directions without difficulty   Assessment   Limitation Decreased ADL status; Decreased endurance;Decreased self-care trans;Decreased high-level ADLs   Prognosis Good   Assessment Pt is a 78 y o  female seen for OT evaluation s/p admit to [facility] on 12/27/2019 w/ Closed displaced fracture of right femoral neck ([address] )  Comorbidities affecting pt's functional performance at time of assessment include: Parkinson's Disease, A-fib, see above    Personal factors affecting pt at time of IE include:limited home support  Prior to admission, pt was I with ADLs/IADLs and functional mobility using SPC  Upon evaluation: Pt requires max a for LB ADLs, mod a x1 for bed mobility, and min a x1 with rw for functional transfers/mobility   2* the following deficits impacting occupational performance: weakness, decreased strength, decreased balance and increased pain  Pt to benefit from continued skilled OT tx while in the hospital to address deficits as defined above and maximize level of functional independence w ADL's and functional mobility  Occupational Performance areas to address include: grooming, bathing/shower, toilet hygiene, dressing and functional mobility  From OT standpoint, recommendation at time of d/c would be short term rehab stay prior to returning home   Goals   LTG Time Frame 10-14   Long Term Goal #1 Pt  to perform ADL transfers with rw and Supervision    Long Term Goal #2 Pt  to perform toileting tasks with min a   Long Term Goal Pt  to perform bed mobility with min a    Plan   Treatment Interventions ADL retraining;Functional transfer training;UE strengthening/ROM; Endurance training;Patient/family training;Equipment evaluation/education; Activityengagement   Goal Expiration Date 01/12/20   OT Treatment Day 1   OT Frequency 3-5x/wk   Additional Treatment Session   Start Time 6155   End Time 0905   Recommendation   OT Discharge Recommendation Short Term Rehab   OT - OK to Discharge Yes  (When patient is medcially stable)   Barthel Index   Feeding 10   Bathing 0   Grooming Score 0   Dressing Score 5   Bladder Score 10   Bowels Score 10   Toilet Use Score 5   Transfers (Bed/Chair) Score 10   Mobility (Level Surface) Score 0   Stairs Score 0   Barthel Index Score 50     Pt  OOB in bedside chair at end of session with chair alarm on, SCDs on, lines intact and needs in reach       Kane Monte OT

## 2019-12-29 NOTE — ASSESSMENT & PLAN NOTE
Appears mechanical in nature - She reports that she lost her balance while coming out of her car  She states that she was ok prior to fall  She did hit he head sustating a hematoma to right side of her forehead  She denies LOC, Headache, Visual disturbances  Head CT shows-   No acute cranial hemorrhage, mass effect or extra-axial collection  2   Right frontal scalp hematoma   No acute calvarial fracture  CT cervical spine shows - No acute cervical spine fracture or traumatic malalignment  Xray and CT R hip shows radiologic evidence of right femoral neck fracture  Continue Fall precautions  Pain control  PT/OT - recommending short term rehab

## 2019-12-29 NOTE — ASSESSMENT & PLAN NOTE
Blood pressure elevated upon arrival to the ER and remains elevated yesterday 188/118  Now improved at 168/84 today  She has increased pain S/P fall, and suspect some mild anxiety which may be contributing  Held HCTZ perioperatively due to risk of SEFERINO, however will restart this morning  Continue PRN IV labetalol and PO propranolol  Monitor blood pressure closely  Consider adding additional antihypertensive agent if BP remains elevated

## 2019-12-29 NOTE — PROGRESS NOTES
Progress Note - Orthopedics   Bronwyn Ramirez 78 y o  female MRN: [id number]  Unit/Bed#: 411-01      Subjective:    78 y  o female seen and examined this morning  POD 1 s/p right hip cannulated screw fixation  No acute events, no complaints  Pt doing well  Patient denies any pain at this time  She has yet to get up and OOB  Denies fevers chills, CP, SOB       Labs:  0   Lab Value Date/Time    HCT 35 8 12/29/2019 0436    HCT 36 2 12/28/2019 0439    HCT 38 1 12/27/2019 2019    HGB 11 3 (L) 12/29/2019 0436    HGB 11 5 12/28/2019 0439    HGB 12 0 12/27/2019 2019    INR 1 16 12/28/2019 0439    WBC 9 21 12/29/2019 0436    WBC 7 52 12/28/2019 0439    WBC 6 51 12/27/2019 2019       Meds:    Current Facility-Administered Medications:     acetaminophen (TYLENOL) tablet 650 mg, 650 mg, Oral, Q6H PRN, Daphne Gregorio MD    carbidopa-levodopa (SINEMET)  mg per tablet 1 tablet, 1 tablet, Oral, TID, Daphne Gregorio MD, 1 tablet at 12/28/19 2111    ceFAZolin (ANCEF) IVPB (premix) 2,000 mg, 2,000 mg, Intravenous, Q8H, Daphne Gregorio MD, Last Rate: 100 mL/hr at 12/29/19 0429, 2,000 mg at 12/29/19 0429    ferrous sulfate tablet 325 mg, 325 mg, Oral, Daily With Breakfast, Daphne Gregorio MD, 325 mg at 12/28/19 0945    fish oil capsule 1,000 mg, 1,000 mg, Oral, Daily, Daphne Gregorio MD, 1,000 mg at 12/28/19 0945    heparin (porcine) subcutaneous injection 5,000 Units, 5,000 Units, Subcutaneous, Q8H [address], Daphne Gregorio MD, 5,000 Units at 12/29/19 0514    iron polysaccharides (FERREX) capsule 150 mg, 150 mg, Oral, Daily With Breakfast, Daphne Gregorio MD, 150 mg at 12/28/19 0946    Labetalol HCl (NORMODYNE) injection 5 mg, 5 mg, Intravenous, Q4H PRN, Daphne Gregorio MD, 5 mg at 12/28/19 0122    ondansetron (ZOFRAN) injection 4 mg, 4 mg, Intravenous, Q6H PRN, Daphne Gregorio MD    oxyCODONE (ROXICODONE) IR tablet 5 mg, 5 mg, Oral, Q4H PRN **OR** oxyCODONE (ROXICODONE) immediate release tablet 10 mg, 10 mg, Oral, Q4H PRN, Noé Horn Cheryl Keita MD    [facility]anolol [facility]) tablet 20 mg, 20 mg, Oral, BID, Paula Silverio MD, 20 mg at 12/28/19 1716    timolol (TIMOPTIC) 0 5 % ophthalmic solution 1 drop, 1 drop, Both Eyes, BID, Paula Silverio MD, 1 drop at 12/28/19 1717    Blood Culture:   No results found for: BLOODCX    Wound Culture:   No results found for: WOUNDCULT    Ins and Outs:  I/O last 24 hours: In: 960 [P O :360; I V :600]  Out: 1560 [Urine:1550; Blood:10]          Physical:  Vitals:    12/29/19 0701   BP: 170/84   Pulse: 80   Resp: 16   Temp: 98 4 °F (36 9 °C)   SpO2: 95%     Musculoskeletal: right Lower Extremity  · Skin: no erythema, mild thigh swelling as to be expected  No signs of active bleeding at surgical site  · Dressing: C/D/I  · TTP: Tammi-incisional tenderness as to be expected  · SILT s/s/sp/dp/t  +fhl/ehl, +ankle dorsi/plantar flexion  · 2+ DP pulse  Limb is warm and well perfused  Good color and capillary refill of the toes  Assessment:    78 y  o female POD 1 s/p Right hip cannulated screw fixation  Plan:  · WBAT RLE  · Hgb 11 3 this morning, down from 11 5  Will continue to monitor  · PT/OT  · Pain control prn   · DVT ppx: Heparine q8, SCDs           Diana S ELOY Baltazar

## 2019-12-29 NOTE — ASSESSMENT & PLAN NOTE
Rate currently controlled   Anticoagulated on Xarelto but at modified dosing of 3 times weekly with aspirin alternated in between due to cost concerns  Held Xarelto perioperatively due to planned OR intervention for hip fracture, restarted today  Would recommend continuing Xarelto daily at least in the short term not only due to A fib, but also for adequate DVT prophylaxis in the setting of ORIF R hip  Patient is agreeable and will discuss further with short term rehab attending and orthopedics  Ideally she should continue daily Xarelto dosing until seen by Orthopedics in the office postoperatively

## 2019-12-29 NOTE — UTILIZATION REVIEW
Initial Clinical Review    Admission: Date/Time/Statement: Inpatient Admission Orders (From admission, onward)     Ordered        12/27/19 2321  Inpatient Admission  Once                   Orders Placed This Encounter   Procedures    Inpatient Admission     Standing Status:   Standing     Number of Occurrences:   1     Order Specific Question:   Admitting Physician     Answer:   Cory Sandoval     Order Specific Question:   Level of Care     Answer:   Med Surg [16]     Order Specific Question:   Estimated length of stay     Answer:   More than 2 Midnights     Order Specific Question:   Certification     Answer:   I certify that inpatient services are medically necessary for this patient for a duration of greater than two midnights  See H&P and MD Progress Notes for additional information about the patient's course of treatment  ED Arrival Information     Expected Arrival Acuity Means of Arrival Escorted By Service Admission Type    - 12/27/2019 19:30 Emergent Wheelchair Family Member General Medicine Emergency    Arrival Complaint    Fall        Chief Complaint   Patient presents with    Fall     at about [phone] pt states she was getting out of a car when she fell  She bumped her head and her hip  Has bruising at both sites  She currently is taking Carvidopa and Levodopa for Parkinsons and claims she has tremors  Assessment/Plan: 77 yo f  With a PMH of parkinson's, CAD s/p stent, Bella, hysterectomy  She present to the Er s/p fall trying to get out of her car  She stumbled and did hit her headon the floor she denies LOC  She was unable to get up on her own or bear weight on her RLE, she complains of pain to her R hip area  Ct R hip -  Nondisplaced fractue of the femoral neck  Case discussed with Ortho - plan admit hold Xarelto on Heparin for DVT  To assess for possible Surgical intervention    Patient has been admitted on inpatient status med [facility] level care for further management of right hip fracture, mechanical fall, hypokalemia, urinary tract infection        Op report - 12/28 - Procedure(s) (LRB):  OPEN REDUCTION W/ INTERNAL FIXATION (ORIF) HIP WITH CANNUALATED SCREWS (Right) Melissa  Anesthesia - general   Operative Findings:Stable valgus impacted fracture  Fixed with 3 cannulated screws      ED Triage Vitals [12/27/19 1945]   Temperature Pulse Respirations Blood Pressure SpO2   (!) 97 1 °F (36 2 °C) 92 18 (!) 223/110 96 %      Temp Source Heart Rate Source Patient Position - Orthostatic VS BP Location FiO2 (%)   Oral Monitor Lying Right arm --      Pain Score       No Pain        Wt Readings from Last 1 Encounters:   12/29/19 68 9 kg (151 lb 14 4 oz)     Additional Vital Signs:   Date/Time  Temp  Pulse  Resp  BP  MAP (mmHg)  SpO2  O2 Device  Cardiac (WDL)  Patient Position - Orthostatic VS   12/29/19 0918  --  89  --  168/84  112  97 %  None (Room air)  --  Sitting   12/29/19 07:01:34  98 4 °F (36 9 °C)  80  16  170/84  113  95 %  --  --  --   12/28/19 22:10:17  97 3 °F (36 3 °C)Abnormal   72  18  99/58  72  94 %  --  --  --   12/28/19 15:23:53  97 3 °F (36 3 °C)Abnormal   83  16  96/60  72  97 %  --  --  --   12/28/19 13:09:20  98 5 °F (36 9 °C)  87  16  113/70  84  97 %  --  --  --   12/28/19 1256  98 8 °F (37 1 °C)  83  20  155/72  --  96 %  Nasal cannula  --  --   12/28/19 1251  98 3 °F (36 8 °C)  96  21  158/65  --  97 %  Nasal cannula  --  --   12/28/19 1236  98 4 °F (36 9 °C)  92  16  129/64  --  96 %  Nasal cannula  --  --   12/28/19 1224  --  --  --  --  --  --  Nasal cannula  --  --   12/28/19 1221  98 3 °F (36 8 °C)  72  --  113/56  --  100 %  Simple mask  X  --   12/28/19 0800  --  --  --  --  --  97 %  None (Room air)  --  --   12/28/19 07:31:32  --  78  --  188/113Abnormal   138  95 %  --  --  --   12/28/19 07:30:45  --  90  16  188/113Abnormal   138  94 %  --  --  --   12/28/19 0251  --  --  --  107/77  --  --  --  --  Lying   12/28/19 0110  --  80  --  178/100Abnormal   --  --  --  -- Lying   12/28/19 0100  --  --  --  186/134Abnormal   --  --  --  --  Lying   12/28/19 00:33:53  98 2 °F (36 8 °C)  74  17  194/99Abnormal   131  98 %  Nasal cannula  --  Sitting   12/27/19 2345  --  90  18  155/91  --  96 %  Nasal cannula  --  Sitting   12/27/19 22:53:02  --  108Abnormal   22  211/104Abnormal   --  94 %  --  --  Sitting   12/27/19 2230  --  130Abnormal   22  258/119Abnormal   --  88 %Abnormal   --  --  Sitting   12/27/19 2215  --  99  22  235/107Abnormal   --  89 %Abnormal   --  --  Sitting   12/27/19 2159  --  90  22  259/126Abnormal   --  92 %  --  --  Sitting   12/27/19 2150  --  101  16  241/115Abnormal   --  94 %  --  --  Sitting   12/27/19 2129  --  128Abnormal   22  188/84Abnormal   --  93 %  --  --  Sitting   12/27/19 21:05:18  --  95  20  185/116Abnormal   --  93 %  --  --  Sitting   12/27/19 2059  --  90  20  218/118Abnormal   --  94 %  --  --  Sitting   12/27/19 2045  --  84  19  197/97Abnormal   --  93 %  None (Room air)  --  Sitting   12/27/19 2030  --  89  --  --  --  93 %  None (Room air)  --  Sitting   12/27/19 2029  --  99  18  218/128Abnormal   --  93 %  --  --  Sitting   12/27/19 2015  --  --  18  --  --  --  None (Room air)  --  Sitting           Pertinent Labs/Diagnostic Test Results:   Results from last 7 days   Lab Units 12/29/19 0436 12/28/19 0439 12/27/19 2019   WBC Thousand/uL 9 21 7 52 6 51   HEMOGLOBIN g/dL 11 3* 11 5 12 0   HEMATOCRIT % 35 8 36 2 38 1   PLATELETS Thousands/uL 178 168 193   NEUTROS ABS Thousands/µL 7 73* 6 01 4 45     Results from last 7 days   Lab Units 12/29/19  0436 12/28/19  0439 12/27/19 2019   SODIUM mmol/L 134* 137 138   POTASSIUM mmol/L 3 8 3 9 3 4*   CHLORIDE mmol/L 101 102 102   CO2 mmol/L 24 26 27   ANION GAP mmol/L 9 9 9   BUN mg/dL 16 17 23   CREATININE mg/dL 0 87 0 81 0 87   EGFR ml/min/1 73sq m 63 68 63   CALCIUM mg/dL 9 8 9 6 10 1   MAGNESIUM mg/dL  --  1 8 1 9   PHOSPHORUS mg/dL  --  2 9  --      Results from last 7 days   Lab Units 12/28/19 0439   AST U/L 23   ALT U/L 16   ALK PHOS U/L 77   TOTAL PROTEIN g/dL 6 8   ALBUMIN g/dL 3 3*   TOTAL BILIRUBIN mg/dL 0 80     Results from last 7 days   Lab Units 12/29/19 0436 12/28/19 0439 12/27/19 2019   GLUCOSE RANDOM mg/dL 129 107 95     Results from last 7 days   Lab Units 12/28/19 0439 12/27/19 2019   PROTIME seconds 14 9* 13 7   INR  1 16 1 05   PTT seconds 30  --      Results from last 7 days   Lab Units 12/27/19 2042   CLARITY UA  Slightly Cloudy   COLOR UA  Yellow   SPEC GRAV UA  1 025   PH UA  5 5   GLUCOSE UA mg/dl Negative   KETONES UA mg/dl Negative   BLOOD UA  Negative   PROTEIN UA mg/dl Negative   NITRITE UA  Positive*   BILIRUBIN UA  Negative   UROBILINOGEN UA E U /dl 0 2   LEUKOCYTES UA  Negative   WBC UA /hpf 4-10*   RBC UA /hpf None Seen   BACTERIA UA /hpf Innumerable*   EPITHELIAL CELLS WET PREP /hpf Occasional     Results from last 7 days   Lab Units 12/27/19 2042   URINE CULTURE  >100,000 cfu/ml Escherichia coli*       XR Hip R 12/29 -  Fluoroscopic guidance provided for surgical procedure       Ct Chest  - 12/27 - No discrete acute process seen in the chest     Fusiform aneurysmal dilatation of the ascending aorta which measures approximately 4 2 cm in maximal dimensions   The aorta is tortuous but no acute aortic process is seen  Predominantly oval-shaped but irregular lesion in the inferior lateral aspect of the left breast which measures approximately 4 5 x 4 0 x 4 1 cm in size   This is best appreciated on axial image 32, series 2   There is some surrounding fat stranding/edema in the left breast    A component of this may be posttraumatic or related to prior intervention given the patient's history, however correlation with the patient's physical exam as well as follow-up imaging including mammography/ultrasound could be considered to confirm resolution and exclude underlying breast lesion at the discretion of the referring caregiver      Cardiomegaly, coronary and aortic atherosclerosis, small hiatal hernia, and other findings as above  Ct R Hip - 12/27 - Nondisplaced fracture of the femoral neck     Ct Cervical Spine - 12/27 No acute cervical spine fracture or traumatic malalignment  Ct Head -12/27 -  1   No acute cranial hemorrhage, mass effect or extra-axial collection  2   Right frontal scalp hematoma   No acute calvarial fracture  CXR 12/28 -Mild cardiomegaly    No acute cardiopulmonary disease  Xr R hip 12/28 - Acute mildly displaced subcapital fracture right hip     ED Treatment:   Medication Administration from 12/27/2019 1930 to 12/28/2019 0031       Date/Time Order Dose Route Action     12/27/2019 2114 ceFAZolin (ANCEF) IVPB (premix) 2,000 mg 2,000 mg Intravenous New Bag     12/27/2019 2223 acetaminophen (TYLENOL) tablet 975 mg 975 mg Oral Given     12/27/2019 2140 iohexol (OMNIPAQUE) 350 MG/ML injection (MULTI-DOSE) 100 mL 100 mL Intravenous Given     12/27/2019 2224 potassium chloride (K-DUR,KLOR-CON) CR tablet 40 mEq 40 mEq Oral Given     12/27/2019 2238 hydrochlorothiazide (HYDRODIURIL) tablet 12 5 mg 12 5 mg Oral Given     12/27/2019 2222 propranolol (INDERAL) tablet 20 mg 20 mg Oral Given     12/27/2019 2355 Labetalol HCl (NORMODYNE) injection 10 mg 10 mg Intravenous Given        Past Medical History:   Diagnosis Date    Parkinson's disease ([facility] ) 01/2018     Present on Admission:   Hypertensive urgency   [facility]   Acute cystitis without hematuria   Fall from slip, trip, or stumble, initial encounter   Atrial fibrillation, permanent   Hypokalemia   Closed displaced fracture of right femoral neck (HCC)      Admitting Diagnosis: Hip pain [M25 559]  Head injury [S09 90XA]  Contusion of head [S00 93XA]  Closed displaced fracture of right femoral neck ([facility] ) [S72 001A]  Fall with injury, initial encounter [W19  XXXA]  Age/Sex: 78 y o  female  Admission Orders:  Scheduled Medications:    Medications:  carbidopa-levodopa 1 tablet Oral TID   cefazolin 2,000 mg Intravenous Q8H   ferrous sulfate 325 mg Oral Daily With Breakfast   fish oil 1,000 mg Oral Daily   hydrochlorothiazide 12 5 mg Oral BID   iron polysaccharides 150 mg Oral Daily With Breakfast   propranolol 20 mg Oral BID   rivaroxaban 20 mg Oral Daily With Breakfast   timolol 1 drop Both Eyes BID     Continuous IV Infusions:     PRN Meds:    acetaminophen 650 mg Oral Q6H PRN   Labetalol HCl 5 mg Intravenous Q4H PRN   ondansetron 4 mg Intravenous Q6H PRN   oxyCODONE 5 mg Oral Q4H PRN   Or      oxyCODONE 10 mg Oral Q4H PRN         Network Utilization Review Department  [email or web address]  ATTENTION: Please call with any questions or concerns to [phone] and carefully listen to the prompts so that you are directed to the right person  All voicemails are confidential   Jim Ferrer all requests for admission clinical reviews, approved or denied determinations and any other requests to dedicated fax number below belonging to the campus where the patient is receiving treatment   List of dedicated fax numbers for the Facilities:  [address] DENIALS (Administrative/Medical Necessity) [phone]   [address] (Maternity/NICU/Pediatrics) [phone]   Homer Quarles [phone]   Josephine Promise [phone]   Catha Mercedes [phone]   [address] [place] [address]  [phone]   [facility] [address] [phone]   [facility]  [phone]   [address] [facility] [address] [facility]  [address] [facility] [address] [facility] [phone]

## 2019-12-29 NOTE — ASSESSMENT & PLAN NOTE
UA shows-positive nitrites, 4-10 WBCs, innumerable bacteria with only ocassional epithelial cells  She denies abdominal pain, dysuria, hematuria, or urinary frequency or urgency  Urine culture showing >100,000 colony count gram negative rods  Continue IV cefazolin for now, will plan for short course of treatment given lack of symptoms    Monitor renal output/renal function  AM labs  Supportive care

## 2019-12-30 LAB
ANION GAP SERPL CALCULATED.3IONS-SCNC: 8 MMOL/L (ref 4–13)
BACTERIA UR CULT: ABNORMAL
BASOPHILS # BLD AUTO: 0.06 THOUSANDS/ΜL (ref 0–0.1)
BASOPHILS NFR BLD AUTO: 1 % (ref 0–1)
BUN SERPL-MCNC: 21 MG/DL (ref 5–25)
CALCIUM SERPL-MCNC: 9.8 MG/DL (ref 8.3–10.1)
CHLORIDE SERPL-SCNC: 101 MMOL/L (ref 100–108)
CO2 SERPL-SCNC: 26 MMOL/L (ref 21–32)
CREAT SERPL-MCNC: 0.84 MG/DL (ref 0.6–1.3)
EOSINOPHIL # BLD AUTO: 0.29 THOUSAND/ΜL (ref 0–0.61)
EOSINOPHIL NFR BLD AUTO: 3 % (ref 0–6)
ERYTHROCYTE [DISTWIDTH] IN BLOOD BY AUTOMATED COUNT: 14.8 % (ref 11.6–15.1)
GFR SERPL CREATININE-BSD FRML MDRD: 65 ML/MIN/1.73SQ M
GLUCOSE SERPL-MCNC: 87 MG/DL (ref 65–140)
HCT VFR BLD AUTO: 34.6 % (ref 34.8–46.1)
HGB BLD-MCNC: 11.1 G/DL (ref 11.5–15.4)
IMM GRANULOCYTES # BLD AUTO: 0.06 THOUSAND/UL (ref 0–0.2)
IMM GRANULOCYTES NFR BLD AUTO: 1 % (ref 0–2)
LYMPHOCYTES # BLD AUTO: 1.38 THOUSANDS/ΜL (ref 0.6–4.47)
LYMPHOCYTES NFR BLD AUTO: 16 % (ref 14–44)
MCH RBC QN AUTO: 28.5 PG (ref 26.8–34.3)
MCHC RBC AUTO-ENTMCNC: 32.1 G/DL (ref 31.4–37.4)
MCV RBC AUTO: 89 FL (ref 82–98)
MONOCYTES # BLD AUTO: 0.9 THOUSAND/ΜL (ref 0.17–1.22)
MONOCYTES NFR BLD AUTO: 11 % (ref 4–12)
NEUTROPHILS # BLD AUTO: 5.82 THOUSANDS/ΜL (ref 1.85–7.62)
NEUTS SEG NFR BLD AUTO: 68 % (ref 43–75)
NRBC BLD AUTO-RTO: 0 /100 WBCS
PLATELET # BLD AUTO: 184 THOUSANDS/UL (ref 149–390)
PMV BLD AUTO: 10.4 FL (ref 8.9–12.7)
POTASSIUM SERPL-SCNC: 3.5 MMOL/L (ref 3.5–5.3)
RBC # BLD AUTO: 3.9 MILLION/UL (ref 3.81–5.12)
SODIUM SERPL-SCNC: 135 MMOL/L (ref 136–145)
WBC # BLD AUTO: 8.51 THOUSAND/UL (ref 4.31–10.16)

## 2019-12-30 PROCEDURE — 97116 GAIT TRAINING THERAPY: CPT

## 2019-12-30 PROCEDURE — 99024 POSTOP FOLLOW-UP VISIT: CPT | Performed by: ORTHOPAEDIC SURGERY

## 2019-12-30 PROCEDURE — 97110 THERAPEUTIC EXERCISES: CPT

## 2019-12-30 PROCEDURE — G8979 MOBILITY GOAL STATUS: HCPCS

## 2019-12-30 PROCEDURE — 80048 BASIC METABOLIC PNL TOTAL CA: CPT | Performed by: ORTHOPAEDIC SURGERY

## 2019-12-30 PROCEDURE — G8978 MOBILITY CURRENT STATUS: HCPCS

## 2019-12-30 PROCEDURE — 97163 PT EVAL HIGH COMPLEX 45 MIN: CPT

## 2019-12-30 PROCEDURE — 97535 SELF CARE MNGMENT TRAINING: CPT

## 2019-12-30 PROCEDURE — 97530 THERAPEUTIC ACTIVITIES: CPT

## 2019-12-30 PROCEDURE — 85025 COMPLETE CBC W/AUTO DIFF WBC: CPT | Performed by: PHYSICIAN ASSISTANT

## 2019-12-30 PROCEDURE — 99232 SBSQ HOSP IP/OBS MODERATE 35: CPT | Performed by: PHYSICIAN ASSISTANT

## 2019-12-30 RX ADMIN — POLYSACCHARIDE-IRON COMPLEX 150 MG: 150 CAPSULE ORAL at 08:30

## 2019-12-30 RX ADMIN — HYDROCHLOROTHIAZIDE 12.5 MG: 12.5 TABLET ORAL at 19:33

## 2019-12-30 RX ADMIN — CEFAZOLIN SODIUM 2000 MG: 2 SOLUTION INTRAVENOUS at 16:58

## 2019-12-30 RX ADMIN — CARBIDOPA AND LEVODOPA 1 TABLET: 25; 100 TABLET ORAL at 08:34

## 2019-12-30 RX ADMIN — RIVAROXABAN 20 MG: 10 TABLET, FILM COATED ORAL at 08:30

## 2019-12-30 RX ADMIN — PROPRANOLOL HYDROCHLORIDE 20 MG: 20 TABLET ORAL at 08:34

## 2019-12-30 RX ADMIN — OMEGA-3 FATTY ACIDS CAP 1000 MG 1000 MG: 1000 CAP at 08:30

## 2019-12-30 RX ADMIN — TIMOLOL MALEATE 1 DROP: 5 SOLUTION/ DROPS OPHTHALMIC at 17:00

## 2019-12-30 RX ADMIN — CARBIDOPA AND LEVODOPA 1 TABLET: 25; 100 TABLET ORAL at 22:05

## 2019-12-30 RX ADMIN — CEFAZOLIN SODIUM 2000 MG: 2 SOLUTION INTRAVENOUS at 04:36

## 2019-12-30 RX ADMIN — TIMOLOL MALEATE 1 DROP: 5 SOLUTION/ DROPS OPHTHALMIC at 09:10

## 2019-12-30 RX ADMIN — CARBIDOPA AND LEVODOPA 1 TABLET: 25; 100 TABLET ORAL at 16:58

## 2019-12-30 RX ADMIN — PROPRANOLOL HYDROCHLORIDE 20 MG: 20 TABLET ORAL at 19:33

## 2019-12-30 RX ADMIN — FERROUS SULFATE TAB 325 MG (65 MG ELEMENTAL FE) 325 MG: 325 (65 FE) TAB at 08:30

## 2019-12-30 RX ADMIN — HYDROCHLOROTHIAZIDE 12.5 MG: 12.5 TABLET ORAL at 08:32

## 2019-12-30 RX ADMIN — OXYCODONE HYDROCHLORIDE 5 MG: 5 TABLET ORAL at 08:31

## 2019-12-30 NOTE — PLAN OF CARE
Problem: OCCUPATIONAL THERAPY ADULT  Goal: Performs self-care activities at highest level of function for planned discharge setting  See evaluation for individualized goals  Description  Treatment Interventions: ADL retraining, Functional transfer training, UE strengthening/ROM, Endurance training, Patient/family training, Equipment evaluation/education, Activityengagement          See flowsheet documentation for full assessment, interventions and recommendations      Note:   Limitation: Decreased ADL status, Decreased endurance, Decreased self-care trans, Decreased high-level ADLs  Prognosis: Good  Assessment: pt presents to OT session this date; pt performed at (S)-min (A) level with use of RW; limited by fatigue and endurance; will benefit from continued OT intervention during hospital admission and recommend STR upon discharge     OT Discharge Recommendation: Short Term Rehab  OT - OK to Discharge: Yes(When patient is medcially stable)

## 2019-12-30 NOTE — ASSESSMENT & PLAN NOTE
Blood pressure elevated upon arrival to the ER and remains elevated yesterday 188/118  Now improved  She has increased pain S/P fall, and suspect some mild anxiety which may be contributing  Held HCTZ perioperatively due to risk of SEFERINO, however was restarted on 12/29  Continue PRN IV labetalol and PO propranolol  Monitor blood pressure closely  Consider adding additional antihypertensive agent if BP remains elevated

## 2019-12-30 NOTE — PLAN OF CARE
Problem: PHYSICAL THERAPY ADULT  Goal: Performs mobility at highest level of function for planned discharge setting  See evaluation for individualized goals  Description  Treatment/Interventions: Functional transfer training, LE strengthening/ROM, Therapeutic exercise, Endurance training, Bed mobility, Gait training          See flowsheet documentation for full assessment, interventions and recommendations  Outcome: Progressing  Note:   Prognosis: Good  Problem List: Decreased strength, Decreased range of motion, Decreased endurance, Impaired balance, Decreased mobility, Orthopedic restrictions  Assessment: Pt  seen for PT treatment session this date with interventions consisting of gait training w/ emphasis on improving pt's ability to ambulate  Pt  Currently performing  tx and ambulation at (min ) x 1 level of function  Utilizing RW with fair balance and stability  Transfer training to increase functional task performance and  to promote safe sit/stand and stand/sit mobility  Pt  education  for hand postion and safety and technique with transfer training  In comparison to previous session, Pt  With improvements in activity tolerance  Minimal discomfort R hip with functional mobility  Pt is in need of continued activity in PT to improve strength balance endurance mobility transfers and ambulation with return to maximize LOF  From PT/mobility standpoint, recommendation at time of d/c would be STR in order to promote return to PLOF and independence  Recommendation: Short-term skilled PT          See flowsheet documentation for full assessment

## 2019-12-30 NOTE — PHYSICAL THERAPY NOTE
PHYSICAL THERAPY NOTE          Patient Name: Misael Osborn  [id number] Date: 12/30/2019 12/30/19 1412   Restrictions/Precautions   Weight Bearing Precautions Per Order Yes   RLE Weight Bearing Per Order WBAT   Other Precautions   (Fall Risk;Bed Alarm; Chair Alarm)   General   Family/Caregiver Present Yes   Subjective   Subjective States she has very little discomfort in her hiip   Bed Mobility   Sit to Supine 4  Minimal assistance   Additional items Assist x 1;HOB elevated; Increased time required;Verbal cues;LE management   Transfers   Sit to Stand 4  Minimal assistance   Additional items Assist x 1; Armrests; Increased time required;Verbal cues   Stand to Sit 4  Minimal assistance   Additional items Assist x 1;Verbal cues   Stand pivot 4  Minimal assistance   Additional items Verbal cues   Ambulation/Elevation   Gait pattern   (Excessively slow; Short stride; Foward flexed)   Gait Assistance 4  Minimal assist   Additional items Assist x 1;Verbal cues   Assistive Device Rolling walker   Distance 40'   Balance   Ambulatory Fair   Endurance Deficit   Endurance Deficit Yes   Activity Tolerance   Activity Tolerance Patient limited by fatigue   Exercises   Hip Flexion Sitting;10 reps   Hip Abduction Sitting;20 reps   Hip Adduction Sitting;20 reps   Knee AROM Long Arc Quad Sitting;20 reps   Ankle Pumps Sitting;20 reps   Assessment   Prognosis Good   Problem List Decreased strength;Decreased range of motion;Decreased endurance; Impaired balance;Decreased mobility;Orthopedic restrictions   Assessment Pt  seen for PT treatment session this date with interventions consisting of gait training w/ emphasis on improving pt's ability to ambulate  Pt  Currently performing  tx and ambulation at (min ) x 1 level of function  Utilizing RW with fair balance and stability   Transfer training to increase functional task performance and  to promote safe sit/stand and stand/sit mobility  Pt  education  for hand postion and safety and technique with transfer training  In comparison to previous session, Pt  With improvements in activity tolerance  Minimal discomfort R hip with functional mobility  Pt is in need of continued activity in PT to improve strength balance endurance mobility transfers and ambulation with return to maximize LOF  From PT/mobility standpoint, recommendation at time of d/c would be STR in order to promote return to PLOF and independence  Goals   LTG Expiration Date 01/13/20   Plan   Treatment/Interventions Functional transfer training;LE strengthening/ROM; Therapeutic exercise;Elevations; Endurance training;Bed mobility;Gait training   Progress Progressing toward goals   Recommendation   Recommendation Short-term skilled PT   Pt  In bed  with call bell within reach, scd's connected and turned on and alarm on at end of PT session  Discussed with Jasmine Brown PT today's treatment and patient's current level of function for care coordination

## 2019-12-30 NOTE — PROGRESS NOTES
Orthopedics   Eve Gallardo 78 y o  female MRN: [id number]  Unit/Bed#: 411-01      Subjective:  78 y  o female post operative day 2 right hip cannulated screws for fracture  Pt doing well  Pain controlled      Labs:  0   Lab Value Date/Time    HCT 34 6 (L) 12/30/2019 0436    HCT 35 8 12/29/2019 0436    HCT 36 2 12/28/2019 0439    HGB 11 1 (L) 12/30/2019 0436    HGB 11 3 (L) 12/29/2019 0436    HGB 11 5 12/28/2019 0439    INR 1 16 12/28/2019 0439    WBC 8 51 12/30/2019 0436    WBC 9 21 12/29/2019 0436    WBC 7 52 12/28/2019 0439       Meds:    Current Facility-Administered Medications:     acetaminophen (TYLENOL) tablet 650 mg, 650 mg, Oral, Q6H PRN, Gilma Jeffries MD    carbidopa-levodopa (SINEMET)  mg per tablet 1 tablet, 1 tablet, Oral, TID, Gilma Jeffries MD, 1 tablet at 12/30/19 6849    ceFAZolin (ANCEF) IVPB (premix) 2,000 mg, 2,000 mg, Intravenous, Q8H, Gilma Jeffries MD, Last Rate: 100 mL/hr at 12/30/19 0436, 2,000 mg at 12/30/19 0436    ferrous sulfate tablet 325 mg, 325 mg, Oral, Daily With Breakfast, Gilma Jeffries MD, 325 mg at 12/30/19 0830    fish oil capsule 1,000 mg, 1,000 mg, Oral, Daily, Gilma Jeffries MD, 1,000 mg at 12/30/19 0830    hydrochlorothiazide (HYDRODIURIL) tablet 12 5 mg, 12 5 mg, Oral, BID, Nelson De Los Santos PA-C, 12 5 mg at 12/30/19 9390    iron polysaccharides (FERREX) capsule 150 mg, 150 mg, Oral, Daily With Breakfast, Gilma Jeffries MD, 150 mg at 12/30/19 0830    Labetalol HCl (NORMODYNE) injection 5 mg, 5 mg, Intravenous, Q4H PRN, Gilma Jeffries MD, 5 mg at 12/28/19 0122    ondansetron (ZOFRAN) injection 4 mg, 4 mg, Intravenous, Q6H PRN, Gilma Jeffries MD    oxyCODONE (ROXICODONE) IR tablet 5 mg, 5 mg, Oral, Q4H PRN, 5 mg at 12/30/19 0831 **OR** oxyCODONE (ROXICODONE) immediate release tablet 10 mg, 10 mg, Oral, Q4H PRN, Gilma Jeffries MD    propranolol (INDERAL) tablet 20 mg, 20 mg, Oral, BID, Gilma Jeffries MD, 20 mg at 12/30/19 0834    rivaroxaban (XARELTO) tablet 20 mg, 20 mg, Oral, Daily With Breakfast, Nelson De Los Santos PA-C, 20 mg at 12/30/19 0830    timolol (TIMOPTIC) 0 5 % ophthalmic solution 1 drop, 1 drop, Both Eyes, BID, Alda Rueda MD, 1 drop at 12/29/19 1729    Blood Culture:   No results found for: BLOODCX    Wound Culture:   No results found for: WOUNDCULT    Ins and Outs:  I/O last 24 hours: In: 840 [P O :840]  Out: 1000 [Urine:1000]          Physical:  Vitals:    12/30/19 0836   BP: (!) 186/109   Pulse: (!) 129   Resp:    Temp:    SpO2: 97%     right lower extremity  · Dressing clean dry intact  · Sensation intact L2-S1  · Motor intact to knee flexion/extension, EHL/FHL  · 2+ DP pulse    Assessment: 78 y  o female post operative day 2 right hip cannulated screws    Plan:  · Up and out of bed  · Weightbearing as tolerated right lower extremity  · PT/OT  · DVT prophylaxis  · Analgesics  · Dispo: [facility] Just for discharge from ortho perspective  · Will continue to assess for acute blood loss anemia    Alda Rueda MD

## 2019-12-30 NOTE — SOCIAL WORK
Chart reviewed by CM,assessment was completed at the bedside with the pt and her  present, pt is inedpendent except for driving , her  drives short distances and her family drives long distance trips, family brings them food and help as needed,pt lives in a 1 story home, 5 steps outside and a ramp, no steps inside they enter the basement form outside, pt has walker and bp cuff , rx plan at [facility],  A post acute care recommendation was made by your care team for   Discussed Freedom of Choice with patient  List of facilities given to patient via in person  patient aware the list is custom filtered for them by insurance and that [facility] post acute providers are designated  [facility] were [facility] as requested, tentaitive d/c for tomorrow as discussed at care coordination rounds, pt needs a precert and will need transportation set up when pt is able to be d/c ,cm will continue to follow, waiting acceptance to a facility and then the auth process can be sarted  Patient/caregiver received discharge checklist   Content reviewed  Patient/caregiver encouraged to participate in discharge plan of care prior to discharge home  CM reviewed d/c planning process including the following: identifying help at home, patient preference for d/c planning needs, availability of treatment team to discuss questions or concerns patient and/or family may have regarding understanding medications and recognizing signs and symptoms once discharged  CM also encouraged patient to follow up with all recommended appointments after discharge  Patient advised of importance for patient and family to participate in managing patients medical well being

## 2019-12-30 NOTE — ASSESSMENT & PLAN NOTE
UA shows-positive nitrites, 4-10 WBCs, innumerable bacteria with only ocassional epithelial cells  She denies abdominal pain, dysuria, hematuria, or urinary frequency or urgency  Urine culture showing >100,000 cfu/ml Escherichia coli  Continue IV cefazolin for now, will plan for short course of treatment given lack of symptoms    Monitor renal output/renal function  AM labs  Supportive care

## 2019-12-30 NOTE — OCCUPATIONAL THERAPY NOTE
Occupational Therapy Progress Note     Patient Name: Josey Nelson  VJLZM'N Date: 12/30/2019  Problem List  Principal Problem:    Closed displaced fracture of right femoral neck ([facility] [address] )  Active Problems:    Hypertensive urgency    Parkinson disease ([facility] [address] )    Acute cystitis without hematuria    Fall from slip, trip, or stumble, initial encounter    Atrial fibrillation, permanent    Hypokalemia       12/30/19 0854   Restrictions/Precautions   Weight Bearing Precautions Per Order Yes   RLE Weight Bearing Per Order WBAT   Other Precautions Chair Alarm; Bed Alarm; Fall Risk   Pain Assessment   Pain Assessment No/denies pain   ADL   Where Assessed Edge of bed   LB Bathing Assistance 3  Moderate Assistance   LB Bathing Deficit Right lower leg including foot; Left lower leg including foot   LB Dressing Assistance 3  Moderate Assistance   LB Dressing Comments pt demonstrates ability to bend forward at EOB to don underwear over L LE, with significant time pt requires min (A) for R LE and max (A) to pull up over hips in standing with RW due to balance and stability deficits and requires use of RW    Bed Mobility   Supine to Sit 4  Minimal assistance   Additional items Assist x 1;Bedrails; Increased time required;Verbal cues;LE management   Sit to Supine   (seated in chair at end of session)   Additional Comments pt on RA during session; no complaints of SOB; SPO2 WFL; BP elevated at 186/109; nurse present and aware of the same   Transfers   Sit to Stand 4  Minimal assistance   Additional items Assist x 2; Increased time required;Verbal cues   Stand to Sit 4  Minimal assistance   Additional items Assist x 2; Increased time required;Verbal cues  (RW)   Additional Comments pt with increased (A) this date; no significant LOB or instability   Functional Mobility   Functional Mobility 4  Minimal assistance   Additional Comments x2; pt performed ~40ft during session;  decreased endurance and fatigue during session   Additional items Rolling walker   Cognition   Overall Cognitive Status WFL   Arousal/Participation Alert; Cooperative   Attention Within functional limits   Orientation Level Oriented X4   Memory Within functional limits   Following Commands Follows all commands and directions without difficulty   Activity Tolerance   Activity Tolerance Patient limited by fatigue   Medical Staff Made Aware nurse present during session   Assessment   Assessment pt presents to OT session this date; pt performed at (S)-min (A) level with use of RW; limited by fatigue and endurance; will benefit from continued OT intervention during hospital admission and recommend STR upon discharge   Plan   Goal Expiration Date 01/12/20   OT Treatment Day 2   OT Frequency 3-5x/wk   Recommendation   OT Discharge Recommendation Short Term Rehab     Pt will benefit from continued OT services in order to maximize (I) c ADL performance, FM c RW, and improve overall endurance/strength required to complete functional tasks in preparation for d/c  Pt left seated in chair at end of session; all needs within reach; all lines intact; scds connected and turned on

## 2019-12-30 NOTE — SOCIAL WORK
I met again with the pt and her , I made them aware that the 5 th floor does not have a female bed and they have been accepted to [facility] and they are in agreement with [facility], pt notes were obtained and t clinical was sent to the insurance company for [employer] auth at 14:40 pm ref# [id number],WAYNE tobias continue to follow

## 2019-12-30 NOTE — ASSESSMENT & PLAN NOTE
POD# 2 right hip cannulated screws  X-ray right hip and CT right hip shows-Nondisplaced fracture of the femoral neck  Xarelto held perioperatively, restarted on 12/29/19  Continue Pain management PRN  PT/OT -  would benefit from short-term rehab on discharge  The patient is agreeable, CM to make referrals

## 2019-12-30 NOTE — PHYSICAL THERAPY NOTE
Physical Therapy Evaluation    Patient Name: Cristi Denson    JQJTM'S Date: 12/30/2019     Problem List  Principal Problem:    Closed displaced fracture of right femoral neck ([facility] [address] )  Active Problems:    Hypertensive urgency    Parkinson disease ([facility] [address] )    Acute cystitis without hematuria    Fall from slip, trip, or stumble, initial encounter    Atrial fibrillation, permanent    Hypokalemia       Past Medical History  Past Medical History:   Diagnosis Date    Parkinson's disease ([facility] [address] ) 01/2018        Past Surgical History  Past Surgical History:   Procedure Laterality Date    CARDIAC SURGERY      Pt had stent placed in 2003   [address]             12/30/19 6210   Note Type   Note type Eval/Treat   Pain Assessment   Pain Assessment No/denies pain   Pain Score No Pain   Home Living   Type of [address] [place] [address] One level; Able to live on main level with bedroom/bathroom; Performs ADLs on one level;Ramped entrance   Bathroom Shower/Tub Tub/shower unit   Bathroom Toilet Standard   Bathroom Accessibility Accessible   Home Equipment Walker;Cane;Other (Comment)  (rollator)   Additional Comments pt reports use of SPC at baseline   Prior Function   Level of Branch Independent with ADLs and functional mobility   Lives With Spouse   Receives Help From Family   ADL Assistance Independent   IADLs Independent   Falls in the last 6 months 1 to 4   Vocational Retired   Comments pt (I) with driving   Restrictions/Precautions   [place] Bearing Precautions Per Order Yes   RLE Weight Bearing Per Order WBAT   Other Precautions Fall Risk;Bed Alarm; Chair Alarm   General   Family/Caregiver Present No   Cognition   Overall Cognitive Status WFL   Attention Within functional limits   Orientation Level Oriented X4   Following Commands Follows all commands and directions without difficulty   RLE Assessment   RLE Assessment X  (3-/5 grossly)   LLE Assessment   LLE Assessment WFL  (4/5 grossly)   Coordination   Movements are Fluid and Coordinated 0   Coordination and Movement Description movement slow and with tremor d/t PD   Sensation WFL   Bed Mobility   Supine to Sit 4  Minimal assistance   Additional items Assist x 1; Increased time required;Verbal cues;LE management;HOB elevated   Sit to Supine   (pt seated in chair at end of session)   Transfers   Sit to Stand 4  Minimal assistance   Additional items Assist x 2; Increased time required;Verbal cues;Armrests   Stand to Sit 4  Minimal assistance   Additional items Assist x 1; Increased time required;Verbal cues;Armrests   Ambulation/Elevation   Gait pattern Excessively slow; Short stride; Foward flexed   Gait Assistance 4  Minimal assist   Additional items Assist x 1;Verbal cues   Assistive Device Rolling walker   Distance 30'   Balance   Static Sitting Good   Dynamic Sitting Good   Static Standing Fair   Dynamic Standing Arianna Hooper 2903  (with RW)   Endurance Deficit   Endurance Deficit Yes   Endurance Deficit Description pt fatigues with ambulation   Activity Tolerance   Activity Tolerance Patient limited by fatigue   Assessment   Prognosis Good   Problem List Decreased strength;Decreased endurance; Impaired balance;Decreased mobility;Orthopedic restrictions   Assessment Patient is a 78 y o  female evaluated by Physical Therapy s/p admit to [address] [place] [address],[address] on 12/27/2019 with admitting diagnosis of: Hip pain, Head injury, Contusion of head, Closed displaced fracture of right femoral neck, Syncope and collapse, and principal problem of: Closed displaced fracture of right femoral neck ([address] )  PT was consulted to assess patient's functional mobility and discharge needs  Ordered are PT Evaluation and treatment with activity level of: up with assistance and WBAT Right LE   Comorbidities affecting patient's physical performance at time of assessment include: hypertensive urgency, Parkinson disease, acute cystitis without hematuria, a-fib, hypokalemia  Personal factors affecting the patient at time of IE include: ambulating with assistive device and inability/difficulty performing IADLs  Please locate objective findings from PT assessment regarding body systems outlined above  Upon evaluation, pt requiring Camryn of 1-2 people and RW to perform all functional mobility  Pt reporting no pain at rest and very minimal pain with activity  SPC used at baseline for ambulation, however pt demonstrating good knowledge of RW use  Pt very motivated to participate in PT and will benefit from continued PT intervention during LOS to address current deficits, increase LOF, and facilitate safe d/c when medically appropriate  D/c recommendation at this time is short term skilled PT  Goals   Patient Goals to go to rehab   Short Term Goal #1 Pt will participate in B LE strengthening exercises to facilitate improved functional mobility  LTG Expiration Date 01/13/20   Long Term Goal #1 Pt will perform all functional transfers and bed mobility with SUP and good safety awareness  Long Term Goal #2 Pt will ambulate 150' with RW and SUP while maintaining good functional dynamic balance  Plan   Treatment/Interventions Functional transfer training;LE strengthening/ROM; Therapeutic exercise; Endurance training;Bed mobility;Gait training   PT Frequency Other (Comment)  (BID M-F; 1x/day Sat&Sun)   Recommendation   Recommendation Short-term skilled PT     Pt seated in recliner at end of session with B SCDs applied and all needs in reach

## 2019-12-30 NOTE — ASSESSMENT & PLAN NOTE
Rate currently controlled   Anticoagulated on Xarelto but at modified dosing of 3 times weekly with aspirin alternated in between due to cost concerns  Held Xarelto perioperatively due to planned OR intervention for hip fracture, restarted on 12/29/19  Would recommend continuing Xarelto daily at least in the short term not only due to A fib, but also for adequate DVT prophylaxis in the setting of ORIF R hip  Patient is agreeable and will discuss further with short term rehab attending and orthopedics  Ideally she should continue daily Xarelto dosing until seen by Orthopedics in the office postoperatively

## 2019-12-30 NOTE — PLAN OF CARE
Problem: PHYSICAL THERAPY ADULT  Goal: Performs mobility at highest level of function for planned discharge setting  See evaluation for individualized goals  Description  Treatment/Interventions: Functional transfer training, LE strengthening/ROM, Therapeutic exercise, Endurance training, Bed mobility, Gait training          See flowsheet documentation for full assessment, interventions and recommendations  Note:   Prognosis: Good  Problem List: Decreased strength, Decreased endurance, Impaired balance, Decreased mobility, Orthopedic restrictions  Assessment: Patient is a 78 y o  female evaluated by Physical Therapy s/p admit to [address] [place] [address],[address] on 12/27/2019 with admitting diagnosis of: Hip pain, Head injury, Contusion of head, Closed displaced fracture of right femoral neck, Syncope and collapse, and principal problem of: Closed displaced fracture of right femoral neck ([address] )  PT was consulted to assess patient's functional mobility and discharge needs  Ordered are PT Evaluation and treatment with activity level of: up with assistance and WBAT Right LE  Comorbidities affecting patient's physical performance at time of assessment include: hypertensive urgency, Parkinson disease, acute cystitis without hematuria, a-fib, hypokalemia  Personal factors affecting the patient at time of IE include: ambulating with assistive device and inability/difficulty performing IADLs  Please locate objective findings from PT assessment regarding body systems outlined above  Upon evaluation, pt requiring Camryn of 1-2 people and RW to perform all functional mobility  Pt reporting no pain at rest and very minimal pain with activity  SPC used at baseline for ambulation, however pt demonstrating good knowledge of RW use  Pt very motivated to participate in PT and will benefit from continued PT intervention during LOS to address current deficits, increase LOF, and facilitate safe d/c when medically appropriate   D/c recommendation at this time is short term skilled PT  Recommendation: Short-term skilled PT          See flowsheet documentation for full assessment

## 2019-12-30 NOTE — ASSESSMENT & PLAN NOTE
Potassium mildly decreased 3 4 on admission, now resolved  She received potassium chloride 40 mg once p o  In the ER  Continue AM BMP monitoring  Victorina Tobias

## 2019-12-30 NOTE — PROGRESS NOTES
Progress Note - Bronwyn Ramirez 1940, 78 y o  female MRN: [id number]    Unit/Bed#: 411-01 Encounter: [id number]    Primary Care Provider: Yulisa Munroe MD   Date and time admitted to hospital: 12/27/2019  7:34 PM        * Closed displaced fracture of right femoral neck ([address] )  Assessment & Plan  POD# 2 right hip cannulated screws  X-ray right hip and CT right hip shows-Nondisplaced fracture of the femoral neck  Xarelto held perioperatively, restarted on 12/29/19  Continue Pain management PRN  PT/OT -  would benefit from short-term rehab on discharge  The patient is agreeable, CM to make referrals  Hypertensive urgency  Assessment & Plan  Blood pressure elevated upon arrival to the ER and remains elevated yesterday 188/118  Now improved  She has increased pain S/P fall, and suspect some mild anxiety which may be contributing  Held HCTZ perioperatively due to risk of SEFERINO, however was restarted on 12/29  Continue PRN IV labetalol and PO propranolol  Monitor blood pressure closely  Consider adding additional antihypertensive agent if BP remains elevated  Acute cystitis without hematuria  Assessment & Plan  UA shows-positive nitrites, 4-10 WBCs, innumerable bacteria with only ocassional epithelial cells  She denies abdominal pain, dysuria, hematuria, or urinary frequency or urgency  Urine culture showing >100,000 cfu/ml Escherichia coli  Continue IV cefazolin for now, will plan for short course of treatment given lack of symptoms  Monitor renal output/renal function  AM labs  Supportive care    Hypokalemia  Assessment & Plan  Potassium mildly decreased 3 4 on admission, now resolved  She received potassium chloride 40 mg once p o  In the ER  Continue AM BMP monitoring       Atrial fibrillation, permanent  Assessment & Plan  Rate currently controlled   Anticoagulated on Xarelto but at modified dosing of 3 times weekly with aspirin alternated in between due to cost concerns    Held Xarelto perioperatively due to planned OR intervention for hip fracture, restarted on 12/29/19  Would recommend continuing Xarelto daily at least in the short term not only due to A fib, but also for adequate DVT prophylaxis in the setting of ORIF R hip  Patient is agreeable and will discuss further with short term rehab attending and orthopedics  Ideally she should continue daily Xarelto dosing until seen by Orthopedics in the office postoperatively  Parkinson disease [facility])  Assessment & Plan  Continue Sinemet  Fall from slip, trip, or stumble, initial encounter  Assessment & Plan  Appears mechanical in nature - She reports that she lost her balance while coming out of her car  She states that she was ok prior to fall  She did hit he head sustating a hematoma to right side of her forehead  She denies LOC, Headache, Visual disturbances  Head CT shows-   No acute cranial hemorrhage, mass effect or extra-axial collection  2   Right frontal scalp hematoma   No acute calvarial fracture  CT cervical spine shows - No acute cervical spine fracture or traumatic malalignment  Xray and CT R hip shows radiologic evidence of right femoral neck fracture  Continue Fall precautions  Pain control  PT/OT - recommending short term rehab  VTE Pharmacologic Prophylaxis:   Pharmacologic: Rivaroxaban (Xarelto)  Mechanical VTE Prophylaxis in Place: Yes    Patient Centered Rounds: I have performed bedside rounds with nursing staff today  Discussions with Specialists or Other Care Team Provider:  nursing, CM, and attending      Education and Discussions with Family / Patient:  updated at bedside    Time Spent for Care: 20 minutes  More than 50% of total time spent on counseling and coordination of care as described above      Current Length of Stay: 3 day(s)    Current Patient Status: Inpatient   Certification Statement: The patient will continue to require additional inpatient hospital stay due to planned placement to short-term rehab on discharge    Discharge Plan: Likely discharge tomorrow    Code Status: Level 3 - DNAR and DNI      Subjective: The patient was seen and examined  The patient denies any complaints  Objective:     Vitals:   Temp (24hrs), Av 8 °F (36 6 °C), Min:97 4 °F (36 3 °C), Max:98 2 °F (36 8 °C)    Temp:  [97 4 °F (36 3 °C)-98 2 °F (36 8 °C)] 97 4 °F (36 3 °C)  HR:  [] 129  Resp:  [16-18] 16  BP: (153-188)/() 186/109  SpO2:  [95 %-97 %] 97 %  Body mass index is 26 89 kg/m²  Input and Output Summary (last 24 hours): Intake/Output Summary (Last 24 hours) at 2019 1314  Last data filed at 2019 0900  Gross per 24 hour   Intake 240 ml   Output 1000 ml   Net -760 ml       Physical Exam:     Physical Exam   Constitutional: She is oriented to person, place, and time  She appears well-developed and well-nourished  She is active and cooperative  Cardiovascular: Normal rate  An irregularly irregular rhythm present  Pulmonary/Chest: Effort normal and breath sounds normal  She has no decreased breath sounds  She has no wheezes  She has no rhonchi  She has no rales  Abdominal: Soft  Normal appearance and bowel sounds are normal  There is no tenderness  Neurological: She is alert and oriented to person, place, and time  No cranial nerve deficit  Skin: Skin is warm, dry and intact  Nursing note and vitals reviewed        Additional Data:     Labs:    Results from last 7 days   Lab Units 19  0436   WBC Thousand/uL 8 51   HEMOGLOBIN g/dL 11 1*   HEMATOCRIT % 34 6*   PLATELETS Thousands/uL 184   NEUTROS PCT % 68   LYMPHS PCT % 16   MONOS PCT % 11   EOS PCT % 3     Results from last 7 days   Lab Units 19  0436  19  0439   SODIUM mmol/L 135*   < > 137   POTASSIUM mmol/L 3 5   < > 3 9   CHLORIDE mmol/L 101   < > 102   CO2 mmol/L 26   < > 26   BUN mg/dL 21   < > 17   CREATININE mg/dL 0 84   < > 0 81   ANION GAP mmol/L 8   < > 9   CALCIUM mg/dL 9 8   < > 9 6 ALBUMIN g/dL  --   --  3 3*   TOTAL BILIRUBIN mg/dL  --   --  0 80   ALK PHOS U/L  --   --  77   ALT U/L  --   --  16   AST U/L  --   --  23   GLUCOSE RANDOM mg/dL 87   < > 107    < > = values in this interval not displayed  Results from last 7 days   Lab Units 12/28/19  0439   INR  1 16                       * I Have Reviewed All Lab Data Listed Above  * Additional Pertinent Lab Tests Reviewed: All Labs Within Last 24 Hours Reviewed    Imaging:    Imaging Reports Reviewed Today Include: none  Imaging Personally Reviewed by Myself Includes:  none    Recent Cultures (last 7 days):     Results from last 7 days   Lab Units 12/27/19  2042   URINE CULTURE  >100,000 cfu/ml Escherichia coli*       Last 24 Hours Medication List:     Current Facility-Administered Medications:  acetaminophen 650 mg Oral Q6H PRN Lissette Lynch MD    carbidopa-levodopa 1 tablet Oral TID Lissette Lynch MD    cefazolin 2,000 mg Intravenous Q8H Lissette Lynch MD Last Rate: 2,000 mg (12/30/19 0436)   ferrous sulfate 325 mg Oral Daily With Breakfast Lissette Lynch MD    fish oil 1,000 mg Oral Daily Lissette Lynch MD    hydrochlorothiazide 12 5 mg Oral BID Nelson De Los Sanots PA-C    iron polysaccharides 150 mg Oral Daily With Breakfast Lissette Lynch MD    Labetalol HCl 5 mg Intravenous Q4H PRN Lissette Lynch MD    ondansetron 4 mg Intravenous Q6H PRN Lissette Lynch MD    oxyCODONE 5 mg Oral Q4H PRN Lissette Lynch MD    Or        oxyCODONE 10 mg Oral Q4H PRN Lissette Lynch MD    propranolol 20 mg Oral BID Lissette Lynch MD    rivaroxaban 20 mg Oral Daily With Breakfast Nelson De Los Santos PA-C    timolol 1 drop Both Eyes BID Lissette Lynch MD         Today, Patient Was Seen By: Leo Lang PA-C    ** Please Note: Dictation voice to text software may have been used in the creation of this document   **

## 2019-12-30 NOTE — SOCIAL WORK
Pending reference number for SNF/STR at [facility] is [id number]  Clinicals faxed to to [facility] at [phone]

## 2019-12-31 VITALS
TEMPERATURE: 98.2 F | HEART RATE: 90 BPM | DIASTOLIC BLOOD PRESSURE: 83 MMHG | WEIGHT: 149.63 LBS | RESPIRATION RATE: 17 BRPM | SYSTOLIC BLOOD PRESSURE: 144 MMHG | BODY MASS INDEX: 27.53 KG/M2 | HEIGHT: 62 IN | OXYGEN SATURATION: 96 %

## 2019-12-31 LAB
ANION GAP SERPL CALCULATED.3IONS-SCNC: 8 MMOL/L (ref 4–13)
BASOPHILS # BLD AUTO: 0.05 THOUSANDS/ΜL (ref 0–0.1)
BASOPHILS NFR BLD AUTO: 1 % (ref 0–1)
BUN SERPL-MCNC: 18 MG/DL (ref 5–25)
CALCIUM SERPL-MCNC: 9.6 MG/DL (ref 8.3–10.1)
CHLORIDE SERPL-SCNC: 98 MMOL/L (ref 100–108)
CO2 SERPL-SCNC: 26 MMOL/L (ref 21–32)
CREAT SERPL-MCNC: 0.76 MG/DL (ref 0.6–1.3)
EOSINOPHIL # BLD AUTO: 0.24 THOUSAND/ΜL (ref 0–0.61)
EOSINOPHIL NFR BLD AUTO: 3 % (ref 0–6)
ERYTHROCYTE [DISTWIDTH] IN BLOOD BY AUTOMATED COUNT: 14.4 % (ref 11.6–15.1)
GFR SERPL CREATININE-BSD FRML MDRD: 74 ML/MIN/1.73SQ M
GLUCOSE SERPL-MCNC: 92 MG/DL (ref 65–140)
HCT VFR BLD AUTO: 35.8 % (ref 34.8–46.1)
HGB BLD-MCNC: 11.5 G/DL (ref 11.5–15.4)
IMM GRANULOCYTES # BLD AUTO: 0.05 THOUSAND/UL (ref 0–0.2)
IMM GRANULOCYTES NFR BLD AUTO: 1 % (ref 0–2)
LYMPHOCYTES # BLD AUTO: 1.24 THOUSANDS/ΜL (ref 0.6–4.47)
LYMPHOCYTES NFR BLD AUTO: 17 % (ref 14–44)
MCH RBC QN AUTO: 28.4 PG (ref 26.8–34.3)
MCHC RBC AUTO-ENTMCNC: 32.1 G/DL (ref 31.4–37.4)
MCV RBC AUTO: 88 FL (ref 82–98)
MONOCYTES # BLD AUTO: 0.9 THOUSAND/ΜL (ref 0.17–1.22)
MONOCYTES NFR BLD AUTO: 12 % (ref 4–12)
NEUTROPHILS # BLD AUTO: 4.8 THOUSANDS/ΜL (ref 1.85–7.62)
NEUTS SEG NFR BLD AUTO: 66 % (ref 43–75)
NRBC BLD AUTO-RTO: 0 /100 WBCS
PLATELET # BLD AUTO: 200 THOUSANDS/UL (ref 149–390)
PMV BLD AUTO: 10.4 FL (ref 8.9–12.7)
POTASSIUM SERPL-SCNC: 3.4 MMOL/L (ref 3.5–5.3)
RBC # BLD AUTO: 4.05 MILLION/UL (ref 3.81–5.12)
SODIUM SERPL-SCNC: 132 MMOL/L (ref 136–145)
WBC # BLD AUTO: 7.28 THOUSAND/UL (ref 4.31–10.16)

## 2019-12-31 PROCEDURE — 97110 THERAPEUTIC EXERCISES: CPT

## 2019-12-31 PROCEDURE — 99238 HOSP IP/OBS DSCHRG MGMT 30/<: CPT | Performed by: PHYSICIAN ASSISTANT

## 2019-12-31 PROCEDURE — 97530 THERAPEUTIC ACTIVITIES: CPT

## 2019-12-31 PROCEDURE — 97116 GAIT TRAINING THERAPY: CPT

## 2019-12-31 PROCEDURE — 85025 COMPLETE CBC W/AUTO DIFF WBC: CPT | Performed by: PHYSICIAN ASSISTANT

## 2019-12-31 PROCEDURE — 80048 BASIC METABOLIC PNL TOTAL CA: CPT | Performed by: ORTHOPAEDIC SURGERY

## 2019-12-31 PROCEDURE — 97535 SELF CARE MNGMENT TRAINING: CPT

## 2019-12-31 RX ORDER — CEPHALEXIN 500 MG/1
500 CAPSULE ORAL EVERY 12 HOURS SCHEDULED
Qty: 6 CAPSULE | Refills: 0 | Status: SHIPPED | OUTPATIENT
Start: 2019-12-31 | End: 2020-01-03

## 2019-12-31 RX ORDER — OXYCODONE HYDROCHLORIDE 5 MG/1
5 TABLET ORAL EVERY 4 HOURS PRN
Qty: 15 TABLET | Refills: 0 | Status: SHIPPED | OUTPATIENT
Start: 2019-12-31 | End: 2020-01-10

## 2019-12-31 RX ADMIN — TIMOLOL MALEATE 1 DROP: 5 SOLUTION/ DROPS OPHTHALMIC at 09:01

## 2019-12-31 RX ADMIN — HYDROCHLOROTHIAZIDE 12.5 MG: 12.5 TABLET ORAL at 08:57

## 2019-12-31 RX ADMIN — CEFAZOLIN SODIUM 2000 MG: 2 SOLUTION INTRAVENOUS at 08:57

## 2019-12-31 RX ADMIN — OMEGA-3 FATTY ACIDS CAP 1000 MG 1000 MG: 1000 CAP at 08:59

## 2019-12-31 RX ADMIN — RIVAROXABAN 20 MG: 10 TABLET, FILM COATED ORAL at 08:59

## 2019-12-31 RX ADMIN — FERROUS SULFATE TAB 325 MG (65 MG ELEMENTAL FE) 325 MG: 325 (65 FE) TAB at 08:57

## 2019-12-31 RX ADMIN — POLYSACCHARIDE-IRON COMPLEX 150 MG: 150 CAPSULE ORAL at 08:56

## 2019-12-31 RX ADMIN — CEFAZOLIN SODIUM 2000 MG: 2 SOLUTION INTRAVENOUS at 01:26

## 2019-12-31 RX ADMIN — CARBIDOPA AND LEVODOPA 1 TABLET: 25; 100 TABLET ORAL at 08:57

## 2019-12-31 RX ADMIN — PROPRANOLOL HYDROCHLORIDE 20 MG: 20 TABLET ORAL at 08:59

## 2019-12-31 NOTE — ASSESSMENT & PLAN NOTE
UA shows-positive nitrites, 4-10 WBCs, innumerable bacteria with only ocassional epithelial cells  She denies abdominal pain, dysuria, hematuria, or urinary frequency or urgency  Urine culture showing >100,000 cfu/ml Escherichia coli  The patient was started on IV cefazolin and switched to PO keflex per urine culture to complete antibiotic course

## 2019-12-31 NOTE — ASSESSMENT & PLAN NOTE
Blood pressure elevated upon arrival to the ER and remains elevated yesterday 188/118  Now improved  She has increased pain S/P fall, and suspect some mild anxiety which may be contributing  Held HCTZ perioperatively due to risk of SEFERINO, however was restarted on 12/29  Continue PO propranolol    Outpatient follow-up with PCP for continued management

## 2019-12-31 NOTE — PHYSICAL THERAPY NOTE
PHYSICAL THERAPY NOTE          Patient Name: Epifanio Knight  LCIBB'E Date: 12/31/2019 12/31/19 0941   Pain Assessment   Pain Score No Pain   Restrictions/Precautions   Weight Bearing Precautions Per Order Yes   RLE Weight Bearing Per Order WBAT   Other Precautions   (Fall Risk;Bed Alarm; Chair Alarm)   General   Family/Caregiver Present Yes   Subjective   Subjective Agreeable to therapy  States she slept well last night  Transfers   Sit to Stand 5  Supervision   Additional items Assist x 1; Armrests   Stand to Sit 5  Supervision   Additional items Assist x 1; Armrests; Increased time required;Verbal cues   Stand pivot 5  Supervision   Ambulation/Elevation   Gait pattern   (Excessively slow; Short stride; Foward flexed)   Gait Assistance 5  Supervision  (CGA)   Additional items Assist x 1;Verbal cues   Assistive Device Rolling walker   Distance 50'   Balance   Ambulatory Fair   Endurance Deficit   Endurance Deficit Yes   Activity Tolerance   Activity Tolerance Patient limited by fatigue   Exercises   Hip Flexion Sitting;20 reps   Hip Abduction Sitting;20 reps   Hip Adduction Sitting;20 reps   Knee AROM Long Arc Quad Sitting;20 reps   Ankle Pumps Sitting;20 reps   Assessment   Prognosis Good   Problem List Decreased strength;Decreased range of motion;Decreased endurance; Impaired balance;Decreased mobility   Assessment Pt  seen for PT treatment session this date with interventions consisting of gait training w/ emphasis on improving pt's ability to ambulate  Pt  Currently performing  tx and ambulation at (SUP-CGA) x 1 level of function   Utilizing RW with fair balance and stability  Transfer training to increase functional task performance and  to promote safe sit/stand and stand/sit mobility  Pt  education  for hand postion and safety and technique with transfer training   In comparison to previous session, Pt   With improvements in activity tolerance   Increased distance with however excessively slow gait  Minimal discomfort R hip with functional mobility    Pt is in need of continued activity in PT to improve strength balance endurance mobility transfers and ambulation with return to maximize LOF  From PT/mobility standpoint, recommendation at time of d/c would be STR in order to promote return to PLOF and independence  Goals   LTG Expiration Date 01/13/20   PT Treatment Day 2   Plan   Treatment/Interventions Functional transfer training;LE strengthening/ROM; Therapeutic exercise; Endurance training;Elevations; Bed mobility;Gait training   Progress Progressing toward goals   Recommendation   Recommendation Short-term skilled PT   Pt  OOB in chair with call bell within reach, scd's connected and turned on and alarm on at end of PT session  Discussed with Ai Montgomery, PT today's treatment and patient's current level of function for care coordination

## 2019-12-31 NOTE — OCCUPATIONAL THERAPY NOTE
OT Note     12/31/19 0859   Restrictions/Precautions   RLE Weight Bearing Per Order WBAT   ADL   Where Assessed Chair   Grooming Assistance 5  Supervision/Setup   UB Bathing Assistance 5  Supervision/Setup   UB Bathing Comments A with back   LB Bathing Assistance 4  Minimal Assistance   LB Bathing Deficit Steadying;Supervision/safety; Increased time to complete; Buttocks;Right lower leg including foot; Left lower leg including foot   LB Bathing Comments A for standing balance thru gagan care   UB Dressing Assistance 5  Supervision/Setup   UB Dressing Comments A with fasteners   LB Dressing Assistance 4  Minimal Assistance   LB Dressing Deficit Thread LLE into underwear; Thread RLE into underwear;Pull up over hips   LB Dressing Comments Doffs/dons non skids with AE   Transfers   Sit to Stand 5  Supervision   Additional items Increased time required;Verbal cues   Stand to Sit 5  Supervision   Additional items Increased time required;Verbal cues;Armrests   Activity Tolerance   Activity Tolerance Patient tolerated treatment well   Assessment   Assessment Pt with deficits affecting overall functional performance as per initial eval   Pt agreeable to participate a m  self care  Completes as per above following education use of AE to allow increased I thru LB dress  Demonstrates good carryover of same  Motivated to increase LOF  Spoke with OTR who is in agreement ot will benefit from continued active OT services in order to facilitate return to highest LOF and allow return to prior living status  Plan   Goal Expiration Date 01/12/20   OT Treatment Day 3   Recommendation   OT Discharge Recommendation Short Term Rehab   Remains seated recliner at bedside  SCDs applied and turned on  All needs in reach       12/31/19 0859   Restrictions/Precautions   RLE Weight Bearing Per Order WBAT   ADL   Where Assessed Chair   Grooming Assistance 5  Supervision/Setup   UB Bathing Assistance 5  Supervision/Setup   UB Bathing Comments A with back   LB Bathing Assistance 4  Minimal Assistance   LB Bathing Deficit Steadying;Supervision/safety; Increased time to complete; Buttocks;Right lower leg including foot; Left lower leg including foot   LB Bathing Comments A for standing balance thru gagan care   UB Dressing Assistance 5  Supervision/Setup   UB Dressing Comments A with fasteners   LB Dressing Assistance 4  Minimal Assistance   LB Dressing Deficit Thread LLE into underwear; Thread RLE into underwear;Pull up over hips   LB Dressing Comments Doffs/dons non skids with AE   Transfers   Sit to Stand 5  Supervision   Additional items Increased time required;Verbal cues   Stand to Sit 5  Supervision   Additional items Increased time required;Verbal cues;Armrests   Activity Tolerance   Activity Tolerance Patient tolerated treatment well   Assessment   Assessment Pt with deficits affecting overall functional performance as per initial eval   Pt agreeable to participate a m  self care  Completes as per above following education use of AE to allow increased I thru LB dress  Demonstrates good carryover of same  Motivated to increase LOF  Spoke with OTR who is in agreement ot will benefit from continued active OT services in order to facilitate return to highest LOF and allow return to prior living status     Plan   Goal Expiration Date 01/12/20   OT Treatment Day 3   Recommendation   OT Discharge Recommendation Short Term Rehab

## 2019-12-31 NOTE — SOCIAL WORK
Received authorization from [facility] [employer] eklly espinoza (Kim) rpt to go to [facility]  Authorization number is [id number] and update is due on 1/6 to Margaret Bottoms at [facility]  Christina's phone number is [phone] and Fax is [phone]

## 2019-12-31 NOTE — DISCHARGE SUMMARY
Discharge- Toy Laughter 1940, 78 y o  female MRN: [id number]    Unit/Bed#: 411-01 Encounter: [id number]    Primary Care Provider: Torsten Medina MD   Date and time admitted to hospital: 12/27/2019  7:34 PM        * Closed displaced fracture of right femoral neck ([address] )  Assessment & Plan  POD# 3 right hip cannulated screws  X-ray right hip and CT right hip shows-Nondisplaced fracture of the femoral neck  Xarelto held perioperatively, restarted on 12/29/19 and resumed on discharge  Continue Pain management PRN  PT/OT -  would benefit from short-term rehab on discharge  The patient was accepted at [facility] for short-term rehab  Outpatient follow-up with Dr Yodit Osei (ortho)  Outpatient follow-up with PCP upon discharge home for STR  Hypertensive urgency  Assessment & Plan  Blood pressure elevated upon arrival to the ER and remains elevated yesterday 188/118  Now improved  She has increased pain S/P fall, and suspect some mild anxiety which may be contributing  Held HCTZ perioperatively due to risk of SEFERINO, however was restarted on 12/29  Continue PO propranolol  Outpatient follow-up with PCP for continued management       Acute cystitis without hematuria  Assessment & Plan  UA shows-positive nitrites, 4-10 WBCs, innumerable bacteria with only ocassional epithelial cells  She denies abdominal pain, dysuria, hematuria, or urinary frequency or urgency  Urine culture showing >100,000 cfu/ml Escherichia coli  The patient was started on IV cefazolin and switched to PO keflex per urine culture to complete antibiotic course  Hypokalemia  Assessment & Plan  Potassium mildly decreased 3 4 on admission, now resolved  She received potassium chloride 40 mg once p o  In the ER      Atrial fibrillation, permanent  Assessment & Plan  Rate currently controlled   Anticoagulated on Xarelto but at modified dosing of 3 times weekly with aspirin alternated in between due to cost concerns    Held Xarelto perioperatively due to planned OR intervention for hip fracture, restarted on 12/29/19  Would recommend continuing Xarelto daily at least in the short term not only due to A fib, but also for adequate DVT prophylaxis in the setting of ORIF R hip  Patient is agreeable and will discuss further with short term rehab attending and orthopedics  Ideally she should continue daily Xarelto dosing until seen by Orthopedics in the office postoperatively  Parkinson disease [facility])  Assessment & Plan  Continue Sinemet  Fall from slip, trip, or stumble, initial encounter  Assessment & Plan  Appears mechanical in nature - She reports that she lost her balance while coming out of her car  She states that she was ok prior to fall  She did hit he head sustating a hematoma to right side of her forehead  She denies LOC, Headache, Visual disturbances  Head CT shows-   No acute cranial hemorrhage, mass effect or extra-axial collection  2   Right frontal scalp hematoma   No acute calvarial fracture  CT cervical spine shows - No acute cervical spine fracture or traumatic malalignment  Xray and CT R hip shows radiologic evidence of right femoral neck fracture  Continue Fall precautions  PT/OT - recommending short term rehab  The patient was discharged to [facility] for short-term rehab  Discharging Physician / Practitioner: Laura Mccall PA-C  PCP: Carlos Montgomery MD  Admission Date:   Admission Orders (From admission, onward)     Ordered        12/27/19 2321  Inpatient Admission  Once                   Discharge Date: 12/31/19    Resolved Problems  Date Reviewed: 12/31/2019    None          Consultations During Hospital Stay:  · Orthopedic surgery    Procedures Performed:   · Right hip cannulated screws- Dr Leela Llanos (orthopedic surgery)    Significant Findings / Test Results:   Xr Chest 1 View Portable    Result Date: 12/28/2019  Impression: Mild cardiomegaly No acute cardiopulmonary disease   Workstation performed: [id number]     Xr Hip/pelv 2-3 Vws Right If Performed    Result Date: 12/29/2019  Impression: Fluoroscopic guidance provided for surgical procedure  Please refer to the separate procedure notes for additional details  Workstation performed: [id number]     Xr Hip/pelv 2-3 Vws Right If Performed    Result Date: 12/28/2019  Impression: Acute mildly displaced subcapital fracture right hip Findings are consistent with emergency provider's preliminary reading Workstation performed: [id number]     Ct Head Wo Contrast    Result Date: 12/27/2019  Impression: 1  No acute cranial hemorrhage, mass effect or extra-axial collection  2   Right frontal scalp hematoma  No acute calvarial fracture  Workstation performed: [id number]     Ct Chest With Contrast    Result Date: 12/27/2019  Impression: No discrete acute process seen in the chest  Fusiform aneurysmal dilatation of the ascending aorta which measures approximately 4 2 cm in maximal dimensions  The aorta is tortuous but no acute aortic process is seen  Predominantly oval-shaped but irregular lesion in the inferior lateral aspect of the left breast which measures approximately 4 5 x 4 0 x 4 1 cm in size  This is best appreciated on axial image 32, series 2  There is some surrounding fat stranding/edema in the left breast    A component of this may be posttraumatic or related to prior intervention given the patient's history, however correlation with the patient's physical exam as well as follow-up imaging including mammography/ultrasound could be considered to confirm resolution and exclude underlying breast lesion at the discretion of the referring caregiver  Cardiomegaly, coronary and aortic atherosclerosis, small hiatal hernia, and other findings as above  Findings discussed with Dr Kayode Ashraf by Dr Layla Campo at 11:20 PM on 12/27/2019   Workstation performed: [id number]     Ct Spine Cervical Wo Contrast    Result Date: 12/27/2019  Impression: No acute cervical spine fracture or traumatic malalignment  Workstation performed: [id number]     Ct Hip Right Without Contrast    Result Date: 12/27/2019  · Impression: Nondisplaced fracture of the femoral neck as described above  The study was marked in [facility] for immediate notification  Workstation performed: [id number]   ·     Incidental Findings:   · none     Test Results Pending at Discharge (will require follow up):   · none     Outpatient Tests Requested:  · none    Complications:  none    Reason for Admission: right femoral neck fracture s/p fall    Hospital Course:     Helio Rubio is a 78 y o  female patient who originally presented to the hospital on 12/27/2019 due to a fall while trying to get out of her car  She states that she stumbled and fell did hit her head on the floor but denies loss of consciousness  She states that she was fine prior to the fall did not complain of any dizziness prior to the fall  She reports that she was unable to get up on her own or bear weight on her right leg after the fall  She denies chest pain, shortness of breath, nausea, vomiting, headache, visual disturbances, urinary symptoms abdominal pain  She does admit to pain to the right hip region  Emergency room she received Tylenol 975 mg p o  Potassium chloride 40 mEq p o  and started on Ancef 2 g IV  ER attending discussed case with Dr Milana Lopez (orthopedic surgery) recommends to admit patient, hold the Xarelto utilize heparin for DVT prophylaxis  Will see patient here on 12/29/19  for possible OR intervention  At bedside patient is awake and alert mild to moderate distress secondary to pain  Patient has been admitted on inpatient status [facility] level care for further management of right hip fracture, mechanical fall, hypokalemia, urinary tract infection  Please see above list of diagnoses and related plan for additional information  Condition at Discharge: good     Discharge Day Visit / Exam:     Subjective:   The patient states she's feeling well and denies any complaints  Vitals: Blood Pressure: 144/83 (12/31/19 0857)  Pulse: 90 (12/31/19 0857)  Temperature: 98 2 °F (36 8 °C) (12/31/19 0706)  Temp Source: Oral (12/28/19 0033)  Respirations: 17 (12/31/19 0706)  Height: 5' 2" (157 5 cm) (12/30/19 1031)  Weight - Scale: 67 9 kg (149 lb 10 oz) (12/31/19 0533)  SpO2: 96 % (12/31/19 0857)  Exam:   Physical Exam   Constitutional: She is oriented to person, place, and time  She appears well-developed and well-nourished  HENT:   Head: Normocephalic and atraumatic  Cardiovascular: Normal rate and regular rhythm  Pulmonary/Chest: Effort normal and breath sounds normal  She has no wheezes  She has no rales  Abdominal: Soft  Bowel sounds are normal  She exhibits no distension  There is no tenderness  Neurological: She is alert and oriented to person, place, and time  No cranial nerve deficit  Skin: Skin is warm and dry  Nursing note and vitals reviewed  Discussion with Family:  updated at bedside prior to discharge    Discharge instructions/Information to patient and family:   See after visit summary for information provided to patient and family  Provisions for Follow-Up Care:  See after visit summary for information related to follow-up care and any pertinent home health orders  Disposition:     [facility]    For Discharges to [facility]:   · Not Applicable to this Patient - Not Applicable to this Patient    Planned Readmission: no     Discharge Statement:  I spent 25 minutes discharging the patient  This time was spent on the day of discharge  I had direct contact with the patient on the day of discharge  Greater than 50% of the total time was spent examining patient, answering all patient questions, arranging and discussing plan of care with patient as well as directly providing post-discharge instructions    Additional time then spent on discharge activities  Discharge Medications:  See after visit summary for reconciled discharge medications provided to patient and family        ** Please Note: This note has been constructed using a voice recognition system **

## 2019-12-31 NOTE — ASSESSMENT & PLAN NOTE
POD# 3 right hip cannulated screws  X-ray right hip and CT right hip shows-Nondisplaced fracture of the femoral neck  Xarelto held perioperatively, restarted on 12/29/19 and resumed on discharge  Continue Pain management PRN  PT/OT -  would benefit from short-term rehab on discharge  The patient was accepted at [facility] for short-term rehab  Outpatient follow-up with Dr Arnol Jaime (ortho)  Outpatient follow-up with PCP upon discharge home for STR

## 2019-12-31 NOTE — PLAN OF CARE
Problem: OCCUPATIONAL THERAPY ADULT  Goal: Performs self-care activities at highest level of function for planned discharge setting  See evaluation for individualized goals  Description  Treatment Interventions: ADL retraining, Functional transfer training, UE strengthening/ROM, Endurance training, Patient/family training, Equipment evaluation/education, Activityengagement          See flowsheet documentation for full assessment, interventions and recommendations  Outcome: Progressing  Note:   Limitation: Decreased ADL status, Decreased endurance, Decreased self-care trans, Decreased high-level ADLs  Prognosis: Good  Assessment: Pt with deficits affecting overall functional performance as per initial eval   Pt agreeable to participate a m  self care  Completes as per above following education use of AE to allow increased I thru LB dress  Demonstrates good carryover of same  Motivated to increase LOF  Spoke with OTR who is in agreement ot will benefit from continued active OT services in order to facilitate return to highest LOF and allow return to prior living status       OT Discharge Recommendation: Short Term Rehab  OT - OK to Discharge: Yes(When patient is medcially stable)

## 2019-12-31 NOTE — PHYSICAL THERAPY NOTE
PHYSICAL THERAPY NOTE          Patient Name: Adolph Marina  OOPYQ'T Date: 12/31/2019 12/31/19 2145   Pain Assessment   Pain Score No Pain   Restrictions/Precautions   Weight Bearing Precautions Per Order Yes   RLE Weight Bearing Per Order WBAT   Subjective   Subjective Needs to go to the bathroom   Bed Mobility   Supine to Sit 4  Minimal assistance   Additional items Assist x 1;HOB elevated; Bedrails; Increased time required;Verbal cues   Transfers   Sit to Stand 5  Supervision   Additional items Assist x 1;Bedrails; Increased time required;Verbal cues   Stand to Sit 5  Supervision   Additional items Assist x 1; Armrests; Increased time required;Verbal cues   Toilet transfer 4  Minimal assistance   Additional items Assist x 1;Verbal cues;Standard toilet   Additional Comments Pt  able to manage clothing  Requires assist for hygiene   Ambulation/Elevation   Gait pattern   (Excessively slow; Short stride; Foward flexed)   Gait Assistance 5  Supervision   Additional items Assist x 1   Assistive Device Rolling walker   Distance 35' x 1, 20' x 1   Balance   Static Sitting Good   Dynamic Sitting Good   Static Standing Fair   Dynamic Standing Fair   Ambulatory Fair   Endurance Deficit   Endurance Deficit Yes   Activity Tolerance   Activity Tolerance Patient limited by fatigue   Assessment   Prognosis Good   Problem List Decreased strength;Decreased range of motion;Decreased endurance; Impaired balance;Decreased mobility;Orthopedic restrictions   Assessment Pt  seen for PT treatment session this date with interventions consisting of gait training w/ emphasis on improving pt's ability to ambulate  Pt  Currently performing  tx and ambulation at (SUP-CGA) x 1 level of function  Min A for toileting and bed mobility   Utilizing RW with fair balance and stability   Transfer training to increase functional task performance and  to promote safe sit/stand and stand/sit mobility  Pt  education  for hand postion and safety and technique with transfer training   In comparison to previous session, Pt  With improvements in activity tolerance  Minimal discomfort R hip with functional mobility    Pt is in need of continued activity in PT to improve strength balance endurance mobility transfers and ambulation with return to maximize LOF  From PT/mobility standpoint, recommendation at time of d/c would be STR in order to promote return to PLOF and independence  Goals   LTG Expiration Date 01/13/20   PT Treatment Day 2   Plan   Treatment/Interventions Functional transfer training;LE strengthening/ROM; Therapeutic exercise; Endurance training;Bed mobility;Gait training   Progress Progressing toward goals   Recommendation   Recommendation Short-term skilled PT   Pt  OOB in bed  with call bell within reach, scd's connected and turned on and alarm on at end of PT session  Discussed with Regis Aldrich PT today's treatment and patient's current level of function for care coordination

## 2019-12-31 NOTE — ASSESSMENT & PLAN NOTE
Potassium mildly decreased 3 4 on admission, now resolved  She received potassium chloride 40 mg once p o   In the ER

## 2019-12-31 NOTE — ASSESSMENT & PLAN NOTE
Appears mechanical in nature - She reports that she lost her balance while coming out of her car  She states that she was ok prior to fall  She did hit he head sustating a hematoma to right side of her forehead  She denies LOC, Headache, Visual disturbances  Head CT shows-   No acute cranial hemorrhage, mass effect or extra-axial collection  2   Right frontal scalp hematoma   No acute calvarial fracture  CT cervical spine shows - No acute cervical spine fracture or traumatic malalignment  Xray and CT R hip shows radiologic evidence of right femoral neck fracture  Continue Fall precautions  PT/OT - recommending short term rehab  The patient was discharged to [facility] for short-term rehab

## 2019-12-31 NOTE — PLAN OF CARE
Problem: Potential for Falls  Goal: Patient will remain free of falls  Description  INTERVENTIONS:  - Assess patient frequently for physical needs  -  Identify cognitive and physical deficits and behaviors that affect risk of falls  (fall, hip fracture, surgery)  -  [place] fall precautions as indicated by assessment   (high fall risk)  - Educate patient/family on patient safety including physical limitations  - Instruct patient to call for assistance with activity based on assessment  - Modify environment to reduce risk of injury  - Consider OT/PT consult to assist with strengthening/mobility   Outcome: Progressing     Problem: Prexisting or High Potential for Compromised Skin Integrity  Goal: Skin integrity is maintained or improved  Description  INTERVENTIONS:  - Identify patients at risk for skin breakdown  - Assess and monitor skin integrity  - Assess and monitor nutrition and hydration status  - Monitor labs   - Assess for incontinence   - Turn and reposition patient (as needed, self repositions)  - Assist with mobility/ambulation (walker and assist of 1-2)  - Relieve pressure over bony prominences  - Avoid friction and shearing  - Provide appropriate hygiene as needed including keeping skin clean and dry  - Evaluate need for skin moisturizer/barrier cream  - Collaborate with interdisciplinary team   - Patient/family teaching  - Consider wound care consult    Outcome: Progressing     Problem: SKIN/TISSUE INTEGRITY - ADULT  Goal: Skin integrity remains intact  Description  INTERVENTIONS  - Identify patients at risk for skin breakdown  - Assess and monitor skin integrity  - Assess and monitor nutrition and hydration status  - Monitor labs (i e  albumin)  - Assess for incontinence   - Turn and reposition patient (as needed, self repositions)  - Assist with mobility/ambulation (walker and assist of 1-2)  - Relieve pressure over bony prominences  - Avoid friction and shearing  - Provide appropriate hygiene as needed including keeping skin clean and dry  - Evaluate need for skin moisturizer/barrier cream  - Collaborate with interdisciplinary team (i e  Nutrition, Rehabilitation, etc )   - Patient/family teaching   Outcome: Progressing  Goal: Incision(s), wounds(s) or drain site(s) healing without S/S of infection  Description  INTERVENTIONS  - Assess and document risk factors for skin impairment   - Assess and document dressing, incision, wound bed, and surrounding tissue  - Consider nutrition services referral as needed  - Provide patient/ family education   Outcome: Progressing     Problem: MUSCULOSKELETAL - ADULT  Goal: Maintain or return mobility to safest level of function  Description  INTERVENTIONS:  - Assess patient's ability to carry out ADLs; (min assist with cares)  - Assess/evaluate cause of self-care deficits  (fall, hip fracture, surgery)  - Assess range of motion  - Assess patient's mobility (walker and assist of 1-2)  - Assess patient's need for assistive devices and provide as appropriate (walker)  - Encourage maximum independence but intervene and supervise when necessary  - Involve family in performance of ADLs  - Assess for home care needs following discharge   - Consider OT consult to assist with ADL evaluation and planning for discharge  - Provide patient education as appropriate   Outcome: Progressing  Goal: Maintain proper alignment of affected body part  Description  INTERVENTIONS:  - Support, maintain and protect limb and body alignment (lle)  - Provide patient/ family with appropriate education   Outcome: Progressing     Problem: Nutrition/Hydration-ADULT  Goal: Nutrient/Hydration intake appropriate for improving, restoring or maintaining nutritional needs  Description  Monitor and assess patient's nutrition/hydration status for malnutrition  Collaborate with interdisciplinary team and initiate plan and interventions as ordered    Monitor patient's weight and dietary intake as ordered or per policy  Utilize nutrition screening tool and intervene as necessary  Determine patient's food preferences and provide high-protein, high-caloric foods as appropriate       INTERVENTIONS:  - Monitor oral intake, urinary output, labs, and treatment plans  - Assess nutrition and hydration status and recommend course of action  - Evaluate amount of meals eaten  - Assist patient with eating if necessary   - Allow adequate time for meals  - Recommend/ encourage appropriate diets, oral nutritional supplements, and vitamin/mineral supplements  - Order, calculate, and assess calorie counts as needed  - Recommend, monitor, and adjust tube feedings and TPN/PPN based on assessed needs  - Assess need for intravenous fluids  - Provide specific nutrition/hydration education as appropriate  - Include patient/family/caregiver in decisions related to nutrition  Outcome: Progressing     Problem: DISCHARGE PLANNING - CARE MANAGEMENT  Goal: Discharge to post-acute care or home with appropriate resources  Description  INTERVENTIONS:  - Conduct assessment to determine patient/family and health care team treatment goals, and need for post-acute services based on payer coverage, community resources, and patient preferences, and barriers to discharge  - Address psychosocial, clinical, and financial barriers to discharge as identified in assessment in conjunction with the patient/family and health care team  - Arrange appropriate level of post-acute services according to patient's   needs and preference and payer coverage in collaboration with the physician and health care team  - Communicate with and update the patient/family, physician, and health care team regarding progress on the discharge plan  - Arrange appropriate transportation to post-acute venues    Pt's goal is to return home after some rehab   Outcome: Progressing     Problem: PAIN - ADULT  Goal: Verbalizes/displays adequate comfort level or baseline comfort level  Description  Interventions:  - Encourage patient to monitor pain and request assistance  - Assess pain using appropriate pain scale 0-10 pain scale)  - Administer analgesics based on type and severity of pain and evaluate response  - Implement non-pharmacological measures as appropriate and evaluate response  - Consider cultural and social influences on pain and pain management  - Notify physician/advanced practitioner if interventions unsuccessful or patient reports new pain   Outcome: Progressing     Problem: INFECTION - ADULT  Goal: Absence or prevention of progression during hospitalization  Description  INTERVENTIONS:  - Assess and monitor for signs and symptoms of infection  - Monitor lab/diagnostic results  - Monitor all insertion sites, i e  indwelling lines  - Administer medications as ordered  - Instruct and encourage patient and family to use good hand hygiene technique   Outcome: Progressing     Problem: SAFETY ADULT  Goal: Maintain or return to baseline ADL function  Description  INTERVENTIONS:  -  Assess patient's ability to carry out ADLs; (min assist for cares)  - Assess/evaluate cause of self-care deficits  (fall, hip fracture, surgery)  - Assess range of motion  - Assess patient's mobility; (walker and assist of 1-2)  - Assess patient's need for assistive devices and provide as appropriate (walker)  - Encourage maximum independence but intervene and supervise when necessary  - Involve family in performance of ADLs  - Assess for home care needs following discharge   - Consider OT consult to assist with ADL evaluation and planning for discharge  - Provide patient education as appropriate   Outcome: Progressing  Goal: Maintain or return mobility status to optimal level  Description  INTERVENTIONS:  - Assess patient's baseline mobility status (ambulation, transfers, stairs, etc )    - Identify cognitive and physical deficits and behaviors that affect mobility (fall, hip fracture, surgery)  - Identify mobility aids required to assist with transfers and/or ambulation (gait belt, walker)  - [place] fall precautions as indicated by assessment (high fall risk)  - Record patient progress and toleration of activity level on Mobility SBAR; progress patient to next Phase/Stage  - Instruct patient to call for assistance with activity based on assessment  - Consider rehabilitation consult to assist with strengthening/weightbearing, etc    Outcome: Progressing     Problem: DISCHARGE PLANNING  Goal: Discharge to home or other facility with appropriate resources  Description  INTERVENTIONS:  - Identify barriers to discharge w/patient and caregiver  - Arrange for needed discharge resources and transportation as appropriate  - Identify discharge learning needs (meds, wound care, etc )  - Refer to Case Management Department for coordinating discharge planning if the patient needs post-hospital services based on physician/advanced practitioner order or complex needs related to functional status, cognitive ability, or social support system   Outcome: Progressing     Problem: Knowledge Deficit  Goal: Patient/family/caregiver demonstrates understanding of disease process, treatment plan, medications, and discharge instructions  Description  Complete learning assessment and assess knowledge base    Interventions:  - Provide teaching at level of understanding  - Provide teaching via preferred learning methods  Outcome: Progressing

## 2020-01-06 ENCOUNTER — HOSPITAL ENCOUNTER (INPATIENT)
Facility: HOSPITAL | Age: 80
LOS: 11 days | Discharge: HOME HEALTH | DRG: 560 | End: 2020-01-17
Attending: INTERNAL MEDICINE | Admitting: INTERNAL MEDICINE
Payer: COMMERCIAL

## 2020-01-06 DIAGNOSIS — S72.001A CLOSED DISPLACED FRACTURE OF RIGHT FEMORAL NECK (HCC): Primary | ICD-10-CM

## 2020-01-06 PROCEDURE — 97530 THERAPEUTIC ACTIVITIES: CPT

## 2020-01-06 PROCEDURE — 97166 OT EVAL MOD COMPLEX 45 MIN: CPT

## 2020-01-07 ENCOUNTER — TELEPHONE (OUTPATIENT)
Dept: OBGYN CLINIC | Facility: HOSPITAL | Age: 80
End: 2020-01-07

## 2020-01-07 PROCEDURE — 97110 THERAPEUTIC EXERCISES: CPT

## 2020-01-07 PROCEDURE — 97530 THERAPEUTIC ACTIVITIES: CPT

## 2020-01-07 PROCEDURE — 97535 SELF CARE MNGMENT TRAINING: CPT

## 2020-01-07 PROCEDURE — 99024 POSTOP FOLLOW-UP VISIT: CPT | Performed by: PHYSICIAN ASSISTANT

## 2020-01-07 PROCEDURE — 97162 PT EVAL MOD COMPLEX 30 MIN: CPT

## 2020-01-07 NOTE — PROGRESS NOTES
Orthopedics   Enrico Hand 78 y o  female MRN: [id number]  Unit/Bed#: -01      Subjective:  78 y  o female post operative day 10 right hip percutaneous screw fixation of hip fracture, done 12/28/19  Pt doing extremely well in SNF  She reports only occasional minimal pain  Ambulation well and doing well in PT  No other c/o  Labs:  0   Lab Value Date/Time    HCT 35 8 12/31/2019 0439    HCT 34 6 (L) 12/30/2019 0436    HCT 35 8 12/29/2019 0436    HGB 11 5 12/31/2019 0439    HGB 11 1 (L) 12/30/2019 0436    HGB 11 3 (L) 12/29/2019 0436    INR 1 16 12/28/2019 0439    WBC 7 28 12/31/2019 0439    WBC 8 51 12/30/2019 0436    WBC 9 21 12/29/2019 0436     Meds:  No current facility-administered medications for this encounter  Blood Culture:   No results found for: BLOODCX    Wound Culture:   No results found for: WOUNDCULT    Ins and Outs:  No intake/output data recorded  Physical:  There were no vitals filed for this visit   right lower extremity  · Dressing clean dry intact, wound healing well with staples  · Sensation intact L2-S1  · Motor intact to knee flexion/extension, EHL/FHL  · 2+ DP pulse  · Negative Homans sign    Assessment: 78 y  o female post operative day 10 right hip hemiarthroplasty, doing very well  Plan:  · Up and out of bed  · Weightbearing as tolerated right lower extremity  · PT/OT  · DVT prophylaxis  · Analgesics  · Dispo: [id number] [address]amaria for D/C from SNF from ortho standpoint  · Staple removal in 4 days  · Office follow up in 2 weeks with repeat xrays right hip      Chika Shelby PA-C

## 2020-01-07 NOTE — TELEPHONE ENCOUNTER
Aubrey Nunez from [address] at [facility] is calling to request that Dr Agatha Christopher see the patient in the unit, rather than sending her out to her scheduled appointment on January 10, 2020        Please call Aubrey Nunez at [phone]

## 2020-01-08 PROCEDURE — 97110 THERAPEUTIC EXERCISES: CPT

## 2020-01-08 PROCEDURE — 97116 GAIT TRAINING THERAPY: CPT

## 2020-01-08 PROCEDURE — 97530 THERAPEUTIC ACTIVITIES: CPT

## 2020-01-08 PROCEDURE — 97535 SELF CARE MNGMENT TRAINING: CPT

## 2020-01-09 PROCEDURE — 97110 THERAPEUTIC EXERCISES: CPT

## 2020-01-09 PROCEDURE — 97535 SELF CARE MNGMENT TRAINING: CPT

## 2020-01-09 PROCEDURE — 97116 GAIT TRAINING THERAPY: CPT

## 2020-01-09 PROCEDURE — 97530 THERAPEUTIC ACTIVITIES: CPT

## 2020-01-10 PROCEDURE — 97110 THERAPEUTIC EXERCISES: CPT

## 2020-01-10 PROCEDURE — 97116 GAIT TRAINING THERAPY: CPT

## 2020-01-10 PROCEDURE — 97530 THERAPEUTIC ACTIVITIES: CPT

## 2020-01-11 PROCEDURE — 97530 THERAPEUTIC ACTIVITIES: CPT

## 2020-01-11 PROCEDURE — 97110 THERAPEUTIC EXERCISES: CPT

## 2020-01-13 PROCEDURE — 97110 THERAPEUTIC EXERCISES: CPT

## 2020-01-13 PROCEDURE — 99024 POSTOP FOLLOW-UP VISIT: CPT | Performed by: ORTHOPAEDIC SURGERY

## 2020-01-13 PROCEDURE — 97116 GAIT TRAINING THERAPY: CPT

## 2020-01-13 PROCEDURE — 97530 THERAPEUTIC ACTIVITIES: CPT

## 2020-01-14 ENCOUNTER — APPOINTMENT (OUTPATIENT)
Dept: RADIOLOGY | Facility: HOSPITAL | Age: 80
End: 2020-01-14
Payer: COMMERCIAL

## 2020-01-14 PROCEDURE — 97110 THERAPEUTIC EXERCISES: CPT

## 2020-01-14 PROCEDURE — 97530 THERAPEUTIC ACTIVITIES: CPT

## 2020-01-14 PROCEDURE — 73502 X-RAY EXAM HIP UNI 2-3 VIEWS: CPT

## 2020-01-14 PROCEDURE — 97116 GAIT TRAINING THERAPY: CPT

## 2020-01-15 PROCEDURE — 97530 THERAPEUTIC ACTIVITIES: CPT

## 2020-01-15 PROCEDURE — 97116 GAIT TRAINING THERAPY: CPT

## 2020-01-15 PROCEDURE — 97535 SELF CARE MNGMENT TRAINING: CPT

## 2020-01-15 PROCEDURE — 97110 THERAPEUTIC EXERCISES: CPT

## 2020-01-16 PROCEDURE — 97110 THERAPEUTIC EXERCISES: CPT

## 2020-01-16 PROCEDURE — 97530 THERAPEUTIC ACTIVITIES: CPT

## 2020-01-16 PROCEDURE — 97116 GAIT TRAINING THERAPY: CPT

## 2020-01-30 ENCOUNTER — APPOINTMENT (EMERGENCY)
Dept: NON INVASIVE DIAGNOSTICS | Facility: HOSPITAL | Age: 80
End: 2020-01-30
Payer: COMMERCIAL

## 2020-01-30 ENCOUNTER — APPOINTMENT (EMERGENCY)
Dept: RADIOLOGY | Facility: HOSPITAL | Age: 80
End: 2020-01-30
Payer: COMMERCIAL

## 2020-01-30 ENCOUNTER — HOSPITAL ENCOUNTER (OUTPATIENT)
Facility: HOSPITAL | Age: 80
Setting detail: OBSERVATION
Discharge: HOME HEALTH | End: 2020-01-31
Attending: EMERGENCY MEDICINE | Admitting: FAMILY MEDICINE
Payer: COMMERCIAL

## 2020-01-30 ENCOUNTER — APPOINTMENT (EMERGENCY)
Dept: CT IMAGING | Facility: HOSPITAL | Age: 80
End: 2020-01-30
Payer: COMMERCIAL

## 2020-01-30 DIAGNOSIS — I71.2 THORACIC AORTIC ANEURYSM (HCC): ICD-10-CM

## 2020-01-30 DIAGNOSIS — G20 PARKINSON DISEASE (HCC): ICD-10-CM

## 2020-01-30 DIAGNOSIS — I16.0 HYPERTENSIVE URGENCY: ICD-10-CM

## 2020-01-30 DIAGNOSIS — C64.2 RENAL CELL CANCER, LEFT (HCC): ICD-10-CM

## 2020-01-30 DIAGNOSIS — I48.21 ATRIAL FIBRILLATION, PERMANENT (HCC): ICD-10-CM

## 2020-01-30 DIAGNOSIS — R55 SYNCOPE: Primary | ICD-10-CM

## 2020-01-30 PROBLEM — I71.21 THORACIC ASCENDING AORTIC ANEURYSM (HCC): Status: ACTIVE | Noted: 2020-01-30

## 2020-01-30 PROBLEM — N28.89 RENAL MASS: Status: ACTIVE | Noted: 2020-01-30

## 2020-01-30 LAB
ALBUMIN SERPL BCP-MCNC: 3.3 G/DL (ref 3.5–5)
ALP SERPL-CCNC: 105 U/L (ref 46–116)
ALT SERPL W P-5'-P-CCNC: 10 U/L (ref 12–78)
ANION GAP SERPL CALCULATED.3IONS-SCNC: 6 MMOL/L (ref 4–13)
AST SERPL W P-5'-P-CCNC: 15 U/L (ref 5–45)
BASOPHILS # BLD AUTO: 0.05 THOUSANDS/ΜL (ref 0–0.1)
BASOPHILS NFR BLD AUTO: 1 % (ref 0–1)
BILIRUB SERPL-MCNC: 0.5 MG/DL (ref 0.2–1)
BILIRUB UR QL STRIP: NEGATIVE
BUN SERPL-MCNC: 17 MG/DL (ref 5–25)
CALCIUM SERPL-MCNC: 10.2 MG/DL (ref 8.3–10.1)
CHLORIDE SERPL-SCNC: 102 MMOL/L (ref 100–108)
CLARITY UR: CLEAR
CO2 SERPL-SCNC: 29 MMOL/L (ref 21–32)
COLOR UR: YELLOW
CREAT SERPL-MCNC: 0.79 MG/DL (ref 0.6–1.3)
D DIMER PPP FEU-MCNC: 1.2 UG/ML FEU
EOSINOPHIL # BLD AUTO: 0.41 THOUSAND/ΜL (ref 0–0.61)
EOSINOPHIL NFR BLD AUTO: 7 % (ref 0–6)
ERYTHROCYTE [DISTWIDTH] IN BLOOD BY AUTOMATED COUNT: 14.6 % (ref 11.6–15.1)
GFR SERPL CREATININE-BSD FRML MDRD: 70 ML/MIN/1.73SQ M
GLUCOSE SERPL-MCNC: 81 MG/DL (ref 65–140)
GLUCOSE UR STRIP-MCNC: NEGATIVE MG/DL
HCT VFR BLD AUTO: 38.1 % (ref 34.8–46.1)
HGB BLD-MCNC: 11.7 G/DL (ref 11.5–15.4)
HGB UR QL STRIP.AUTO: NEGATIVE
IMM GRANULOCYTES # BLD AUTO: 0.03 THOUSAND/UL (ref 0–0.2)
IMM GRANULOCYTES NFR BLD AUTO: 1 % (ref 0–2)
KETONES UR STRIP-MCNC: NEGATIVE MG/DL
LEUKOCYTE ESTERASE UR QL STRIP: NEGATIVE
LYMPHOCYTES # BLD AUTO: 1.31 THOUSANDS/ΜL (ref 0.6–4.47)
LYMPHOCYTES NFR BLD AUTO: 21 % (ref 14–44)
MAGNESIUM SERPL-MCNC: 2.1 MG/DL (ref 1.6–2.6)
MCH RBC QN AUTO: 27.5 PG (ref 26.8–34.3)
MCHC RBC AUTO-ENTMCNC: 30.7 G/DL (ref 31.4–37.4)
MCV RBC AUTO: 90 FL (ref 82–98)
MONOCYTES # BLD AUTO: 0.66 THOUSAND/ΜL (ref 0.17–1.22)
MONOCYTES NFR BLD AUTO: 11 % (ref 4–12)
NEUTROPHILS # BLD AUTO: 3.79 THOUSANDS/ΜL (ref 1.85–7.62)
NEUTS SEG NFR BLD AUTO: 59 % (ref 43–75)
NITRITE UR QL STRIP: NEGATIVE
NRBC BLD AUTO-RTO: 0 /100 WBCS
NT-PROBNP SERPL-MCNC: 1368 PG/ML
PH UR STRIP.AUTO: 6 [PH]
PLATELET # BLD AUTO: 257 THOUSANDS/UL (ref 149–390)
PMV BLD AUTO: 10.1 FL (ref 8.9–12.7)
POTASSIUM SERPL-SCNC: 4 MMOL/L (ref 3.5–5.3)
PROT SERPL-MCNC: 7.5 G/DL (ref 6.4–8.2)
PROT UR STRIP-MCNC: NEGATIVE MG/DL
RBC # BLD AUTO: 4.25 MILLION/UL (ref 3.81–5.12)
SODIUM SERPL-SCNC: 137 MMOL/L (ref 136–145)
SP GR UR STRIP.AUTO: 1.01 (ref 1–1.03)
TROPONIN I SERPL-MCNC: 0.02 NG/ML
TROPONIN I SERPL-MCNC: 0.03 NG/ML
TROPONIN I SERPL-MCNC: 0.03 NG/ML
TSH SERPL DL<=0.05 MIU/L-ACNC: 2.57 UIU/ML (ref 0.36–3.74)
UROBILINOGEN UR QL STRIP.AUTO: 0.2 E.U./DL
WBC # BLD AUTO: 6.25 THOUSAND/UL (ref 4.31–10.16)

## 2020-01-30 PROCEDURE — 99220 PR INITIAL OBSERVATION CARE/DAY 70 MINUTES: CPT | Performed by: FAMILY MEDICINE

## 2020-01-30 PROCEDURE — 36415 COLL VENOUS BLD VENIPUNCTURE: CPT | Performed by: EMERGENCY MEDICINE

## 2020-01-30 PROCEDURE — 71045 X-RAY EXAM CHEST 1 VIEW: CPT

## 2020-01-30 PROCEDURE — 83735 ASSAY OF MAGNESIUM: CPT | Performed by: EMERGENCY MEDICINE

## 2020-01-30 PROCEDURE — 84484 ASSAY OF TROPONIN QUANT: CPT | Performed by: FAMILY MEDICINE

## 2020-01-30 PROCEDURE — 83880 ASSAY OF NATRIURETIC PEPTIDE: CPT | Performed by: EMERGENCY MEDICINE

## 2020-01-30 PROCEDURE — 85379 FIBRIN DEGRADATION QUANT: CPT | Performed by: EMERGENCY MEDICINE

## 2020-01-30 PROCEDURE — 85025 COMPLETE CBC W/AUTO DIFF WBC: CPT | Performed by: EMERGENCY MEDICINE

## 2020-01-30 PROCEDURE — 71275 CT ANGIOGRAPHY CHEST: CPT

## 2020-01-30 PROCEDURE — 99285 EMERGENCY DEPT VISIT HI MDM: CPT

## 2020-01-30 PROCEDURE — 93971 EXTREMITY STUDY: CPT

## 2020-01-30 PROCEDURE — 70450 CT HEAD/BRAIN W/O DYE: CPT

## 2020-01-30 PROCEDURE — 93005 ELECTROCARDIOGRAM TRACING: CPT

## 2020-01-30 PROCEDURE — 84443 ASSAY THYROID STIM HORMONE: CPT | Performed by: EMERGENCY MEDICINE

## 2020-01-30 PROCEDURE — 99285 EMERGENCY DEPT VISIT HI MDM: CPT | Performed by: EMERGENCY MEDICINE

## 2020-01-30 PROCEDURE — 81003 URINALYSIS AUTO W/O SCOPE: CPT | Performed by: EMERGENCY MEDICINE

## 2020-01-30 PROCEDURE — 84484 ASSAY OF TROPONIN QUANT: CPT | Performed by: EMERGENCY MEDICINE

## 2020-01-30 PROCEDURE — 93971 EXTREMITY STUDY: CPT | Performed by: SURGERY

## 2020-01-30 PROCEDURE — 74174 CTA ABD&PLVS W/CONTRAST: CPT

## 2020-01-30 PROCEDURE — 80053 COMPREHEN METABOLIC PANEL: CPT | Performed by: EMERGENCY MEDICINE

## 2020-01-30 RX ORDER — HYDROCHLOROTHIAZIDE 12.5 MG/1
12.5 TABLET ORAL DAILY
Status: DISCONTINUED | OUTPATIENT
Start: 2020-01-31 | End: 2020-01-31 | Stop reason: HOSPADM

## 2020-01-30 RX ORDER — PROPRANOLOL HYDROCHLORIDE 20 MG/1
20 TABLET ORAL 2 TIMES DAILY
Status: DISCONTINUED | OUTPATIENT
Start: 2020-01-30 | End: 2020-01-31 | Stop reason: HOSPADM

## 2020-01-30 RX ORDER — TIMOLOL MALEATE 5 MG/ML
1 SOLUTION/ DROPS OPHTHALMIC 2 TIMES DAILY
Status: DISCONTINUED | OUTPATIENT
Start: 2020-01-30 | End: 2020-01-31 | Stop reason: HOSPADM

## 2020-01-30 RX ORDER — LABETALOL 20 MG/4 ML (5 MG/ML) INTRAVENOUS SYRINGE
10 EVERY 6 HOURS PRN
Status: DISCONTINUED | OUTPATIENT
Start: 2020-01-30 | End: 2020-01-31 | Stop reason: HOSPADM

## 2020-01-30 RX ORDER — FUROSEMIDE 20 MG/1
20 TABLET ORAL DAILY
Status: DISCONTINUED | OUTPATIENT
Start: 2020-01-31 | End: 2020-01-31 | Stop reason: HOSPADM

## 2020-01-30 RX ADMIN — TIMOLOL MALEATE 1 DROP: 5 SOLUTION/ DROPS OPHTHALMIC at 20:15

## 2020-01-30 RX ADMIN — IOHEXOL 100 ML: 350 INJECTION, SOLUTION INTRAVENOUS at 17:15

## 2020-01-30 RX ADMIN — CARBIDOPA AND LEVODOPA 1 TABLET: 25; 100 TABLET ORAL at 20:14

## 2020-01-30 RX ADMIN — PROPRANOLOL HYDROCHLORIDE 20 MG: 20 TABLET ORAL at 19:11

## 2020-01-30 NOTE — ASSESSMENT & PLAN NOTE
Per CT read, likely renal cell carcinoma  Will discuss findings with family in am and assess if they wish to further pursue this given advanced age

## 2020-01-30 NOTE — PLAN OF CARE
Problem: Potential for Falls  Goal: Patient will remain free of falls  Description  INTERVENTIONS:  - Assess patient frequently for physical needs  -  Identify cognitive and physical deficits and behaviors that affect risk of falls    -  [place] fall precautions as indicated by assessment   - Educate patient/family on patient safety including physical limitations  - Instruct patient to call for assistance with activity based on assessment  - Modify environment to reduce risk of injury  - Consider OT/PT consult to assist with strengthening/mobility  Outcome: Progressing     Problem: CARDIOVASCULAR - ADULT  Goal: Maintains optimal cardiac output and hemodynamic stability  Description  INTERVENTIONS:  - Monitor I/O, vital signs and rhythm  - Monitor for S/S and trends of decreased cardiac output  - Administer and titrate ordered vasoactive medications to optimize hemodynamic stability  - Assess quality of pulses, skin color and temperature  - Assess for signs of decreased coronary artery perfusion  - Instruct patient to report change in severity of symptoms  Outcome: Progressing  Goal: Absence of cardiac dysrhythmias or at baseline rhythm  Description  INTERVENTIONS:  - Continuous cardiac monitoring, vital signs, obtain 12 lead EKG if ordered  - Administer antiarrhythmic and heart rate control medications as ordered  - Monitor electrolytes and administer replacement therapy as ordered  Outcome: Progressing

## 2020-01-30 NOTE — CASE MANAGEMENT
I self referred the patient do to her recent discharge from [facility] on 12/31/2019  She was then a patient on [facility] 5th floor and discharged from there on 1/17/2020  The patient stated she has a follow up appointment with her PCP on 2/19/2020  She has been compliant with her medications  Her family is a great support system to her  The patient stated she has been doing very well but today she had a syncopal episode today and that is why she came to the ED today  I discussed resources with her she denied needing any help at this time  The patient lives with her  in a one story house  There is no NATALYA and a ramp to get in to the home  She has a walker and shower chair  She had nurses from [address] [place] [address] but they were done yesterday 1/29/2020 but she still gets PT in the home from the same company  She is independent with her ADL's  She does not drive her family drives her to where she needs to go  She uses [facility] in [place] [address]  Her family picks her medications up for her  She has [place] [facility]  She did not call her PCP prior to coming to the ED today

## 2020-01-30 NOTE — ASSESSMENT & PLAN NOTE
Resume oral antihypertensive in the form of  Inderal 20 mg p o  Daily  Lasix 20 mg p o  Daily  HCTZ 12 5 mg p o  B i d   Utilize p r n   Labetalol for systolic blood pressure greater than 180  Will consider initiating oral Norvasc tomorrow morning if blood pressure does not improve

## 2020-01-30 NOTE — H&P
H&P Exam - Lang Sero 78 y o  female MRN: [id number]    Unit/Bed#: SR14 Encounter: [id number]    Near syncope  Assessment & Plan  Possibly vasovagal vs  Post-prandial syncope   Check orthostatic blood pressures  Follow-up CTA  dissection protocol  Trend troponin x2  Neurochecks q 4  2D echocardiogram    Hypertensive urgency  Assessment & Plan  Resume oral antihypertensive in the form of  Inderal 20 mg p o  Daily  Lasix 20 mg p o  Daily  HCTZ 12 5 mg p o  B i d   Utilize p r n  Labetalol for systolic blood pressure greater than 180  Will consider initiating oral Norvasc tomorrow morning if blood pressure does not improve    Atrial fibrillation, permanent  Assessment & Plan  With intermittent rapid ventricular response  Resume oral Inderal 20mg po daily  Check 2D echo  [facility] was Xarelto    Renal mass  Assessment & Plan  Per CT read, likely renal cell carcinoma  Will discuss findings with family in am and assess if they wish to further pursue this given advanced age     Thoracic ascending aortic aneurysm ([facility] [address] )  Assessment & Plan  Stable on CT  Improve BP control     Parkinson disease ([facility] [address] )  Assessment & Plan  Continue Sinemet        History of Present Illness      The patient is a pleasant 26-year-old female with past medical history significant for chronic atrial fibrillation and a recent femoral head fracture presents to the ED after having a near syncopal episode witnessed by her   The patient's  and son are present at bedside and able to relate a good history along  The patient was seated eating a sticky bun when her  noted a flat affect and she was not responding, episode lasted approximately 30 seconds and the patient was leaning over and her  was able to sit her up  Episode lasted approximately 60 seconds with return to mental status without confusion or lethargy    Patient is not sure if she can't lately lost consciousness as she is able to relay some history, she is able to describe the episode as all the Strength leaving her body  Review of Systems   Neurological: Positive for syncope and weakness  All other systems reviewed and are negative  Historical Information   Past Medical History:   Diagnosis Date    Parkinson's disease ([address] ) 01/2018     Past Surgical History:   Procedure Laterality Date    CARDIAC SURGERY      Pt had stent placed in 2003   [facility]    Procedure: OPEN REDUCTION W/ INTERNAL FIXATION (ORIF) HIP WITH CANNUALATED SCREWS;  Surgeon: Se Obregon MD;  Location: [facility] MAIN OR;  Service: Orthopedics     Social History   Social History     Substance and Sexual Activity   Alcohol Use Never    Frequency: Never     Social History     Substance and Sexual Activity   Drug Use Never     Social History     Tobacco Use   Smoking Status Never Smoker   Smokeless Tobacco Never Used     Family History: non-contributory    Meds/Allergies   all medications and allergies reviewed  No Known Allergies    Objective   First Vitals:   Blood Pressure: 148/78 (01/30/20 1325)  Pulse: 75 (01/30/20 1325)  Temperature: (!) 97 1 °F (36 2 °C) (01/30/20 1325)  Temp Source: Temporal (01/30/20 1325)  Respirations: 19 (01/30/20 1325)  Weight - Scale: 67 6 kg (149 lb) (01/30/20 1325)  SpO2: 94 % (01/30/20 1325)    Current Vitals:   Blood Pressure: 141/83 (01/30/20 1600)  Pulse: 77 (01/30/20 1600)  Temperature: (!) 97 1 °F (36 2 °C) (01/30/20 1325)  Temp Source: Temporal (01/30/20 1325)  Respirations: 21 (01/30/20 1600)  Weight - Scale: 67 6 kg (149 lb) (01/30/20 1325)  SpO2: 95 % (01/30/20 1600)    No intake or output data in the 24 hours ending 01/30/20 8407    Invasive Devices     Peripheral Intravenous Line            Peripheral IV 01/30/20 Right Antecubital less than 1 day                Physical Exam   Constitutional: She is oriented to person, place, and time   She appears well-developed  HENT:   Head: Normocephalic and atraumatic  Mouth/Throat: No oropharyngeal exudate  Eyes: Pupils are equal, round, and reactive to light  No scleral icterus  Neck: Normal range of motion  No JVD present  Cardiovascular: Normal rate  Regularly irregular     Pulmonary/Chest: Effort normal and breath sounds normal  No stridor  No respiratory distress  She has no wheezes  Abdominal: Soft  Bowel sounds are normal  She exhibits no distension  There is no tenderness  There is no guarding  Musculoskeletal: Normal range of motion  She exhibits edema  Neurological: She is alert and oriented to person, place, and time  She displays normal reflexes  No cranial nerve deficit  Coordination normal    Skin: Skin is warm  Capillary refill takes 2 to 3 seconds  She is not diaphoretic  No erythema  Lab Results:   Lab Results   Component Value Date    WBC 6 25 01/30/2020    HGB 11 7 01/30/2020    HCT 38 1 01/30/2020    MCV 90 01/30/2020     01/30/2020     Lab Results   Component Value Date    SODIUM 137 01/30/2020    K 4 0 01/30/2020     01/30/2020    CO2 29 01/30/2020    AGAP 6 01/30/2020    BUN 17 01/30/2020    CREATININE 0 79 01/30/2020    GLUC 81 01/30/2020    CALCIUM 10 2 (H) 01/30/2020    AST 15 01/30/2020    ALT 10 (L) 01/30/2020    ALKPHOS 105 01/30/2020    TP 7 5 01/30/2020    TBILI 0 50 01/30/2020    EGFR 70 01/30/2020       Imaging:   CTA dissection protocol chest abdomen pelvis w wo contrast   Final Result      Stable ascending thoracic aortic aneurysm measuring 4 2 cm  No dissection  Stable cardiomegaly  Decreased size of left breast hematoma currently measuring 2 3 x 2 3 cm (previous 4 5 x 4 0 cm)  Solid enhancing mass in the left renal upper pole measuring 1 9 x 1 6 cm consistent with renal cell carcinoma  Small sclerotic lesion in the right anterior 3rd rib  Consider nuclear medicine whole body bone scan to exclude metastatic disease  Workstation performed: [id number]         CT head without contrast   Final Result      1  No acute intracranial CT abnormality  2   Age related cerebral atrophy and mild chronic white matter microangiopathy  3   Mild right maxillary sinus disease  No fluid level                  Workstation performed: [id number]         XR chest 1 view portable   Final Result      No acute cardiopulmonary disease              Workstation performed: [id number]         VAS lower limb venous duplex study, unilateral/limited    (Results Pending)       EKG, Pathology, and Other Studies:   Afib    Code Status: Prior  Advance Directive and Living Will:      Power of :    POLST:      Counseling / Coordination of Care:

## 2020-01-30 NOTE — ASSESSMENT & PLAN NOTE
Possibly vasovagal  Check orthostatic blood pressures  Follow-up CTA  dissection protocol  Trend troponin x2  Neurochecks q 4  2D echocardiogram

## 2020-01-30 NOTE — ED PROVIDER NOTES
History  Chief Complaint   Patient presents with    Syncope     Around 1030 today patient was sitting with  having coffee when she had a witnessed episode of syncopy  Patient did not  hit her head  She recalls having a conversation and everything fading away but not the actual event  Patient has a hip surgery last week      78-year-old female presents with an episode of syncope  Patient was sitting at her kitchen table with her  she was having coffee as well as a sticky bone with nuts and all the sudden she had an abnormal sensation coming from her toes bilaterally all the way up to her head  She denies any palpitations she had no pain her  was able to grab her because she started the wean out of the chair there is no history of a fall out of the chair he describes her eyes being open but she was not responding to him she recalls him yelling at her she came around there is no history of any slurred speech she has no headache no tongue biting or bowel or bladder incontinence  Patient is not a diabetic  She had completed eating her sticky bone she denies any preceding lightheadedness there was no spinning sensation she denies any chest abdominal or back pain  There is no history of pleuritic pain  Patient underwent recent repair at the end of December of a right hip fracture she is currently ambulating with a walker the only medication adjustment was that her Xarelto dose was increased to cover for post-op hip surgery  she has no prior history of DVT or PE  She is previously on Xarelto for chronic afib  She does have some lingering swelling to the right leg but it is not worsened she is denying any nausea or vomiting she has had no diarrhea no history of melena or hematochezia she denies any dysuria or increased urinary frequency; no new trauma or falls           Prior to Admission Medications   Prescriptions Last Dose Informant Patient Reported? Taking?    Omega-3 Fatty Acids (FISH OIL) 1,000 mg   Yes No   Sig: Take 1,000 mg by mouth daily   carbidopa-levodopa (SINEMET)  mg per tablet   Yes No   Sig: Take 1 tablet by mouth Three times a day   ergocalciferol (VITAMIN D2) 50,000 units   Yes No   Sig: Take 50,000 Units by mouth   ferrous sulfate 325 (65 Fe) mg tablet   Yes No   Sig: Take 325 mg by mouth daily with breakfast   furosemide (LASIX) 20 mg tablet   Yes No   Sig: Take 20 mg by mouth daily   hydrochlorothiazide (MICROZIDE) 12 5 mg capsule   Yes No   Sig: Take 12 5 mg by mouth   propranolol (INDERAL) 20 mg tablet   Yes No   Sig: Take 20 mg by mouth 2 (two) times a day   rivaroxaban (XARELTO) 20 mg tablet   Yes No   Sig: Take 20 mg by mouth Mon, Wed, Friday   timolol (BETIMOL) 0 5 % ophthalmic solution   Yes No   Si drop 2 (two) times a day      Facility-Administered Medications: None       Past Medical History:   Diagnosis Date    Parkinson's disease ([facility] [address] ) 2018       Past Surgical History:   Procedure Laterality Date    CARDIAC SURGERY      Pt had stent placed in    [address] Right 2019    Procedure: OPEN REDUCTION W/ INTERNAL FIXATION (ORIF) HIP WITH CANNUALATED SCREWS;  Surgeon: Veronica Alas MD;  Location: [facility];  Service: Orthopedics       History reviewed  No pertinent family history  I have reviewed and agree with the history as documented  Social History     Tobacco Use    Smoking status: Never Smoker    Smokeless tobacco: Never Used   Substance Use Topics    Alcohol use: Never     Frequency: Never    Drug use: Never        Review of Systems   Constitutional: Negative for activity change, appetite change, chills, diaphoresis, fatigue and fever  HENT: Negative for congestion, ear pain, rhinorrhea, sneezing and sore throat  Eyes: Negative for discharge  Respiratory: Negative for cough and shortness of breath      Cardiovascular: Positive for leg swelling ([facility]  Negative for chest pain  Gastrointestinal: Negative for abdominal pain, blood in stool, diarrhea, nausea and vomiting  Endocrine: Negative for polyuria  Genitourinary: Negative for difficulty urinating, dysuria, frequency and urgency  Musculoskeletal: Negative for back pain, myalgias, neck pain and neck stiffness  Skin: Negative for rash  Neurological: Positive for syncope  Negative for dizziness, facial asymmetry, speech difficulty, weakness, light-headedness, numbness and headaches  Hematological: Negative for adenopathy  Psychiatric/Behavioral: Negative for confusion  All other systems reviewed and are negative  Physical Exam  Physical Exam   Constitutional: She is oriented to person, place, and time  She appears well-developed  No distress  Articulate; no acute respiratory distress   HENT:   Head: Normocephalic and atraumatic  Right Ear: External ear normal    Left Ear: External ear normal    Nose: Nose normal    Mouth/Throat: Oropharynx is clear and moist  No oropharyngeal exudate  Eyes: Pupils are equal, round, and reactive to light  Conjunctivae and EOM are normal  Right eye exhibits no discharge  Left eye exhibits no discharge  Neck: Normal range of motion  Neck supple  No midline or paraspinous tenderness   Cardiovascular: Normal rate, regular rhythm, normal heart sounds and intact distal pulses  Pulmonary/Chest: Effort normal and breath sounds normal  No stridor  No respiratory distress  She has no wheezes  Abdominal: Soft  Bowel sounds are normal  She exhibits no distension and no mass  There is no tenderness  There is no rebound and no guarding  Back no midline or CVA tenderness   Musculoskeletal: Normal range of motion  She exhibits edema (nonpittting to right leg)  She exhibits no tenderness or deformity  Neurological: She is alert and oriented to person, place, and time  She displays normal reflexes   No cranial nerve deficit or sensory deficit  She exhibits normal muscle tone  Coordination normal    Motor is 5/5 upper extremity lower extremity sensation light touch is intact there is no pronator drift gait not tested  Skin: Skin is warm and dry  She is not diaphoretic  Psychiatric: She has a normal mood and affect  Vitals reviewed  Vital Signs  ED Triage Vitals   Temperature Pulse Respirations Blood Pressure SpO2   01/30/20 1325 01/30/20 1325 01/30/20 1325 01/30/20 1325 01/30/20 1325   (!) 97 1 °F (36 2 °C) 75 19 148/78 94 %      Temp Source Heart Rate Source Patient Position - Orthostatic VS BP Location FiO2 (%)   01/30/20 1325 01/30/20 1325 01/30/20 1325 01/30/20 1325 --   Temporal Monitor Sitting Left arm       Pain Score       01/30/20 1600       No Pain           Vitals:    01/30/20 1500 01/30/20 1545 01/30/20 1600 01/30/20 1730   BP: 162/70  141/83 (!) 182/74   Pulse: 71 78 77 86   Patient Position - Orthostatic VS: Sitting  Lying Lying         Visual Acuity  Visual Acuity      Most Recent Value   L Pupil Size (mm)  3   R Pupil Size (mm)  3          ED Medications  Medications   iohexol (OMNIPAQUE) 350 MG/ML injection (MULTI-DOSE) 100 mL (100 mL Intravenous Given 1/30/20 1715)       Diagnostic Studies  Results Reviewed     Procedure Component Value Units Date/Time    TSH [[id number]]  (Normal) Collected:  01/30/20 1431    Lab Status:  Final result Specimen:  Blood from Arm, Right Updated:  01/30/20 1504     TSH 3RD GENERATON 2 574 uIU/mL     Narrative:       Patients undergoing fluorescein dye angiography may retain small amounts of fluorescein in the body for 48-72 hours post procedure  Samples containing fluorescein can produce falsely depressed TSH values  If the patient had this procedure,a specimen should be resubmitted post fluorescein clearance        Magnesium [[id number]]  (Normal) Collected:  01/30/20 1431    Lab Status:  Final result Specimen:  Blood from Arm, Right Updated:  01/30/20 1504     Magnesium 2 1 mg/dL     NT-BNP PRO [[id number]]  (Abnormal) Collected:  01/30/20 1431    Lab Status:  Final result Specimen:  Blood from Arm, Right Updated:  01/30/20 1504     NT-proBNP 1,368 pg/mL     Comprehensive metabolic panel [[id number]]  (Abnormal) Collected:  01/30/20 1431    Lab Status:  Final result Specimen:  Blood from Arm, Right Updated:  01/30/20 1459     Sodium 137 mmol/L      Potassium 4 0 mmol/L      Chloride 102 mmol/L      CO2 29 mmol/L      ANION GAP 6 mmol/L      BUN 17 mg/dL      Creatinine 0 79 mg/dL      Glucose 81 mg/dL      Calcium 10 2 mg/dL      AST 15 U/L      ALT 10 U/L      Alkaline Phosphatase 105 U/L      Total Protein 7 5 g/dL      Albumin 3 3 g/dL      Total Bilirubin 0 50 mg/dL      eGFR 70 ml/min/1 73sq m     Narrative:       [place] guidelines for Chronic Kidney Disease (CKD):     Stage 1 with normal or high GFR (GFR > 90 mL/min/1 73 square meters)    Stage 2 Mild CKD (GFR = 60-89 mL/min/1 73 square meters)    Stage 3A Moderate CKD (GFR = 45-59 mL/min/1 73 square meters)    Stage 3B Moderate CKD (GFR = 30-44 mL/min/1 73 square meters)    Stage 4 Severe CKD (GFR = 15-29 mL/min/1 73 square meters)    Stage 5 End Stage CKD (GFR <15 mL/min/1 73 square meters)  Note: GFR calculation is accurate only with a steady state creatinine    Troponin I [[id number]]  (Normal) Collected:  01/30/20 1431    Lab Status:  Final result Specimen:  Blood from Arm, Right Updated:  01/30/20 1459     Troponin I 0 03 ng/mL     D-Dimer [[id number]]  (Abnormal) Collected:  01/30/20 1431    Lab Status:  Final result Specimen:  Blood from Arm, Right Updated:  01/30/20 1456     D-Dimer, Quant 1 20 ug/ml FEU     UA w Reflex to Microscopic w Reflex to Culture [[id number]] Collected:  01/30/20 1433    Lab Status:  Final result Specimen:  Urine, Other Updated:  01/30/20 1443     Color, UA Yellow     Clarity, UA Clear     Specific Gravity, UA 1 010     pH, UA 6 0     Leukocytes, UA Negative Nitrite, UA Negative     Protein, UA Negative mg/dl      Glucose, UA Negative mg/dl      Ketones, UA Negative mg/dl      Urobilinogen, UA 0 2 E U /dl      Bilirubin, UA Negative     Blood, UA Negative    CBC and differential [[id number]]  (Abnormal) Collected:  01/30/20 1431    Lab Status:  Final result Specimen:  Blood from Arm, Right Updated:  01/30/20 1441     WBC 6 25 Thousand/uL      RBC 4 25 Million/uL      Hemoglobin 11 7 g/dL      Hematocrit 38 1 %      MCV 90 fL      MCH 27 5 pg      MCHC 30 7 g/dL      RDW 14 6 %      MPV 10 1 fL      Platelets 023 Thousands/uL      nRBC 0 /100 WBCs      Neutrophils Relative 59 %      Immat GRANS % 1 %      Lymphocytes Relative 21 %      Monocytes Relative 11 %      Eosinophils Relative 7 %      Basophils Relative 1 %      Neutrophils Absolute 3 79 Thousands/µL      Immature Grans Absolute 0 03 Thousand/uL      Lymphocytes Absolute 1 31 Thousands/µL      Monocytes Absolute 0 66 Thousand/µL      Eosinophils Absolute 0 41 Thousand/µL      Basophils Absolute 0 05 Thousands/µL                  CTA dissection protocol chest abdomen pelvis w wo contrast   Final Result by Peggy Worley MD (01/30 4375)      Stable ascending thoracic aortic aneurysm measuring 4 2 cm  No dissection  Stable cardiomegaly  Decreased size of left breast hematoma currently measuring 2 3 x 2 3 cm (previous 4 5 x 4 0 cm)  Solid enhancing mass in the left renal upper pole measuring 1 9 x 1 6 cm consistent with renal cell carcinoma  Small sclerotic lesion in the right anterior 3rd rib  Consider nuclear medicine whole body bone scan to exclude metastatic disease  Workstation performed: [id number]         CT head without contrast   Final Result by Aminah Feldman MD (01/30 5850)      1  No acute intracranial CT abnormality  2   Age related cerebral atrophy and mild chronic white matter microangiopathy  3   Mild right maxillary sinus disease    No fluid level Workstation performed: [id number]         XR chest 1 view portable   Final Result by Shadia Schmitz MD (01/30 0360)      No acute cardiopulmonary disease  Workstation performed: [id number]         VAS lower limb venous duplex study, unilateral/limited    (Results Pending)              Procedures  ECG 12 Lead Documentation Only  Date/Time: 1/30/2020 1:40 PM  Performed by: Dusty Seo MD  Authorized by: Dusty Seo MD     Indications / Diagnosis:  Sycope  ECG reviewed by me, the ED Provider: yes    Patient location:  ED  Previous ECG:     Previous ECG:  Unavailable  Rate:     ECG rate:  74    ECG rate assessment: normal    Rhythm:     Rhythm: sinus rhythm    Ectopy:     Ectopy: PVCs    QRS:     QRS axis:  Normal  Comments:      No acute ischemic changes             ED Course  ED Course as of Jan 30 1822   Thu Jan 30, 2020   1539 Prelim venous doppler u/s negative DVT RLE      65 Dr Garcia Drafts evaluated patient in ED recommends obs                                  MDM  Number of Diagnoses or Management Options  Renal cell cancer, left [facility]):   Syncope:   Thoracic aortic aneurysm [facility]):   Diagnosis management comments: Mdm:  Syncope without significant prodromal symptoms or vasovagal trigger  Will continue to monitor on telemetry for arrythmia eg tachy leyla syndrome, afib with RVR, heart block     Evaluate electrolytes check for anemia check TSH,  secondary to continued right leg swelling will evaluate with venous Doppler ultrasound to check for DVT      Review of late December records indicate patient was diagnosed with the descending thoracic aneurysm review of prior records including care everywhere demonstrates no prior imaging of the abdomen secondary to elevated D-dimer will pursue CTA of the chest abdomen pelvis to evaluate for dissection/aneurysm    Patient as well as family were informed of CT findings including renal cell carcinoma and sclerotic region on the rib unable to comment whether the prior breast mass is a separate problem; renal CA does not typically metastasize to breast tissue  Disposition  Final diagnoses:   Syncope   Thoracic aortic aneurysm ([facility] [address] ) - 4 2 cm ascending aorta stable   Renal cell cancer, left (HCC) - 1 9x1 6 solid enhancing Left upper pole mass, sclertic region left rib     Time reflects when diagnosis was documented in both MDM as applicable and the Disposition within this note     Time User Action Codes Description Comment    1/30/2020  5:36 PM Neli Sheehanilling Add [R55] Syncope     1/30/2020  6:03 PM Neli Sheehanilling Add [I71 2] Thoracic aortic aneurysm ([facility] [address] )     1/30/2020  6:03 PM [address] [I71 2] Thoracic aortic aneurysm ([facility] [address] ) 4 2 cm ascending aorta stable    1/30/2020  6:04 PM Neli Collins Add [C64 2] Renal cell cancer, left ([facility] [address] )     1/30/2020  6:05 PM Neli Collins Modify [C64 2] Renal cell cancer, left ([facility] [address] ) 1 9x1 6 solid enhancing Left upper pole mass, sclertic region left rib      ED Disposition     ED Disposition Condition Date/Time Comment    Admit Stable Thu Jan 30, 2020  6:05 PM Case was discussed with Dr Melita Mari  and the patient's admission status was agreed to be Admission Status: observation status to the service of Dr Melita Mari           Follow-up Information    None         Current Discharge Medication List      CONTINUE these medications which have NOT CHANGED    Details   carbidopa-levodopa (SINEMET)  mg per tablet Take 1 tablet by mouth Three times a day      ergocalciferol (VITAMIN D2) 50,000 units Take 50,000 Units by mouth      ferrous sulfate 325 (65 Fe) mg tablet Take 325 mg by mouth daily with breakfast      furosemide (LASIX) 20 mg tablet Take 20 mg by mouth daily      hydrochlorothiazide (MICROZIDE) 12 5 mg capsule Take 12 5 mg by mouth      Omega-3 Fatty Acids (FISH OIL) 1,000 mg Take 1,000 mg by mouth daily      propranolol (INDERAL) 20 mg tablet Take 20 mg by mouth 2 (two) times a day rivaroxaban (XARELTO) 20 mg tablet Take 20 mg by mouth Mon, Wed, Friday      timolol (BETIMOL) 0 5 % ophthalmic solution 1 drop 2 (two) times a day           No discharge procedures on file      ED Provider  Electronically Signed by           Cherelle Whitley MD  01/30/20 0853

## 2020-01-31 ENCOUNTER — APPOINTMENT (OUTPATIENT)
Dept: NON INVASIVE DIAGNOSTICS | Facility: HOSPITAL | Age: 80
End: 2020-01-31
Payer: COMMERCIAL

## 2020-01-31 VITALS
RESPIRATION RATE: 16 BRPM | WEIGHT: 148.37 LBS | OXYGEN SATURATION: 98 % | BODY MASS INDEX: 29.13 KG/M2 | DIASTOLIC BLOOD PRESSURE: 78 MMHG | TEMPERATURE: 97.9 F | HEIGHT: 60 IN | HEART RATE: 82 BPM | SYSTOLIC BLOOD PRESSURE: 150 MMHG

## 2020-01-31 LAB
ALBUMIN SERPL BCP-MCNC: 2.7 G/DL (ref 3.5–5)
ALP SERPL-CCNC: 91 U/L (ref 46–116)
ALT SERPL W P-5'-P-CCNC: <6 U/L (ref 12–78)
ANION GAP SERPL CALCULATED.3IONS-SCNC: 8 MMOL/L (ref 4–13)
AST SERPL W P-5'-P-CCNC: 14 U/L (ref 5–45)
BASOPHILS # BLD AUTO: 0.06 THOUSANDS/ΜL (ref 0–0.1)
BASOPHILS NFR BLD AUTO: 1 % (ref 0–1)
BILIRUB SERPL-MCNC: 0.5 MG/DL (ref 0.2–1)
BUN SERPL-MCNC: 16 MG/DL (ref 5–25)
CALCIUM SERPL-MCNC: 9.7 MG/DL (ref 8.3–10.1)
CHLORIDE SERPL-SCNC: 104 MMOL/L (ref 100–108)
CO2 SERPL-SCNC: 26 MMOL/L (ref 21–32)
CREAT SERPL-MCNC: 0.75 MG/DL (ref 0.6–1.3)
EOSINOPHIL # BLD AUTO: 0.43 THOUSAND/ΜL (ref 0–0.61)
EOSINOPHIL NFR BLD AUTO: 9 % (ref 0–6)
ERYTHROCYTE [DISTWIDTH] IN BLOOD BY AUTOMATED COUNT: 14.7 % (ref 11.6–15.1)
GFR SERPL CREATININE-BSD FRML MDRD: 75 ML/MIN/1.73SQ M
GLUCOSE P FAST SERPL-MCNC: 82 MG/DL (ref 65–99)
GLUCOSE SERPL-MCNC: 82 MG/DL (ref 65–140)
HCT VFR BLD AUTO: 34.1 % (ref 34.8–46.1)
HGB BLD-MCNC: 10.7 G/DL (ref 11.5–15.4)
IMM GRANULOCYTES # BLD AUTO: 0.02 THOUSAND/UL (ref 0–0.2)
IMM GRANULOCYTES NFR BLD AUTO: 0 % (ref 0–2)
LYMPHOCYTES # BLD AUTO: 1.3 THOUSANDS/ΜL (ref 0.6–4.47)
LYMPHOCYTES NFR BLD AUTO: 27 % (ref 14–44)
MAGNESIUM SERPL-MCNC: 2.1 MG/DL (ref 1.6–2.6)
MCH RBC QN AUTO: 27.6 PG (ref 26.8–34.3)
MCHC RBC AUTO-ENTMCNC: 31.4 G/DL (ref 31.4–37.4)
MCV RBC AUTO: 88 FL (ref 82–98)
MONOCYTES # BLD AUTO: 0.5 THOUSAND/ΜL (ref 0.17–1.22)
MONOCYTES NFR BLD AUTO: 10 % (ref 4–12)
NEUTROPHILS # BLD AUTO: 2.55 THOUSANDS/ΜL (ref 1.85–7.62)
NEUTS SEG NFR BLD AUTO: 53 % (ref 43–75)
NRBC BLD AUTO-RTO: 0 /100 WBCS
PLATELET # BLD AUTO: 225 THOUSANDS/UL (ref 149–390)
PMV BLD AUTO: 9.9 FL (ref 8.9–12.7)
POTASSIUM SERPL-SCNC: 3.6 MMOL/L (ref 3.5–5.3)
PROT SERPL-MCNC: 6.5 G/DL (ref 6.4–8.2)
RBC # BLD AUTO: 3.88 MILLION/UL (ref 3.81–5.12)
SODIUM SERPL-SCNC: 138 MMOL/L (ref 136–145)
WBC # BLD AUTO: 4.86 THOUSAND/UL (ref 4.31–10.16)

## 2020-01-31 PROCEDURE — 97166 OT EVAL MOD COMPLEX 45 MIN: CPT

## 2020-01-31 PROCEDURE — 97163 PT EVAL HIGH COMPLEX 45 MIN: CPT

## 2020-01-31 PROCEDURE — 93306 TTE W/DOPPLER COMPLETE: CPT

## 2020-01-31 PROCEDURE — 83735 ASSAY OF MAGNESIUM: CPT | Performed by: FAMILY MEDICINE

## 2020-01-31 PROCEDURE — 99217 PR OBSERVATION CARE DISCHARGE MANAGEMENT: CPT | Performed by: FAMILY MEDICINE

## 2020-01-31 PROCEDURE — 85025 COMPLETE CBC W/AUTO DIFF WBC: CPT | Performed by: FAMILY MEDICINE

## 2020-01-31 PROCEDURE — 80053 COMPREHEN METABOLIC PANEL: CPT | Performed by: FAMILY MEDICINE

## 2020-01-31 PROCEDURE — 93306 TTE W/DOPPLER COMPLETE: CPT | Performed by: INTERNAL MEDICINE

## 2020-01-31 RX ORDER — AMLODIPINE BESYLATE 5 MG/1
5 TABLET ORAL DAILY
Status: DISCONTINUED | OUTPATIENT
Start: 2020-01-31 | End: 2020-01-31 | Stop reason: HOSPADM

## 2020-01-31 RX ORDER — AMLODIPINE BESYLATE 10 MG/1
10 TABLET ORAL DAILY
Status: DISCONTINUED | OUTPATIENT
Start: 2020-01-31 | End: 2020-01-31

## 2020-01-31 RX ORDER — AMLODIPINE BESYLATE 5 MG/1
5 TABLET ORAL DAILY
Qty: 30 TABLET | Refills: 0 | Status: SHIPPED | OUTPATIENT
Start: 2020-02-01

## 2020-01-31 RX ADMIN — PROPRANOLOL HYDROCHLORIDE 20 MG: 20 TABLET ORAL at 09:05

## 2020-01-31 RX ADMIN — TIMOLOL MALEATE 1 DROP: 5 SOLUTION/ DROPS OPHTHALMIC at 09:09

## 2020-01-31 RX ADMIN — AMLODIPINE BESYLATE 5 MG: 5 TABLET ORAL at 09:04

## 2020-01-31 RX ADMIN — HYDROCHLOROTHIAZIDE 12.5 MG: 12.5 TABLET ORAL at 09:04

## 2020-01-31 RX ADMIN — FUROSEMIDE 20 MG: 20 TABLET ORAL at 09:04

## 2020-01-31 RX ADMIN — RIVAROXABAN 20 MG: 10 TABLET, FILM COATED ORAL at 09:05

## 2020-01-31 RX ADMIN — CARBIDOPA AND LEVODOPA 1 TABLET: 25; 100 TABLET ORAL at 09:05

## 2020-01-31 NOTE — SOCIAL WORK
Called pt's PCP office: Dr Richard Mcgowan regarding trying to get pt a sooner appt as pt's appt is not until Feb 19th  Offgagandeep stated there is not a sooner appt at this time but if they have any cancellations they will call pt to get her in sooner

## 2020-01-31 NOTE — PLAN OF CARE
Problem: Potential for Falls  Goal: Patient will remain free of falls  Description  INTERVENTIONS:  - Assess patient frequently for physical needs  -  Identify cognitive and physical deficits and behaviors that affect risk of falls    -  [place] fall precautions as indicated by assessment   - Educate patient/family on patient safety including physical limitations  - Instruct patient to call for assistance with activity based on assessment  - Modify environment to reduce risk of injury  - Consider OT/PT consult to assist with strengthening/mobility  Outcome: Adequate for Discharge     Problem: CARDIOVASCULAR - ADULT  Goal: Maintains optimal cardiac output and hemodynamic stability  Description  INTERVENTIONS:  - Monitor I/O, vital signs and rhythm  - Monitor for S/S and trends of decreased cardiac output  - Administer and titrate ordered vasoactive medications to optimize hemodynamic stability  - Assess quality of pulses, skin color and temperature  - Assess for signs of decreased coronary artery perfusion  - Instruct patient to report change in severity of symptoms  Outcome: Adequate for Discharge  Goal: Absence of cardiac dysrhythmias or at baseline rhythm  Description  INTERVENTIONS:  - Continuous cardiac monitoring, vital signs, obtain 12 lead EKG if ordered  - Administer antiarrhythmic and heart rate control medications as ordered  - Monitor electrolytes and administer replacement therapy as ordered  Outcome: Adequate for Discharge     Problem: DISCHARGE PLANNING - CARE MANAGEMENT  Goal: Discharge to post-acute care or home with appropriate resources  Description  INTERVENTIONS:  - Conduct assessment to determine patient/family and health care team treatment goals, and need for post-acute services based on payer coverage, community resources, and patient preferences, and barriers to discharge  - Address psychosocial, clinical, and financial barriers to discharge as identified in assessment in conjunction with the patient/family and health care team  - Arrange appropriate level of post-acute services according to patient's   needs and preference and payer coverage in collaboration with the physician and health care team  - Communicate with and update the patient/family, physician, and health care team regarding progress on the discharge plan  - Arrange appropriate transportation to post-acute venues  -active [facility] (RN, PT and OT)  -outpatient follow up     Outcome: Adequate for Discharge

## 2020-01-31 NOTE — DISCHARGE INSTRUCTIONS
Parkinson's Disease, Ambulatory Care   GENERAL INFORMATION:   Parkinson disease  is a long-term movement disorder  The brain cells that control movement start to die and cause changes in how you move, feel, and act  Even though Parkinson disease (PD) may progress and have a severe impact on your daily life, it is not a life-threatening disease  Symptoms often increase and get worse over time  Common symptoms include the following:   · Tremors that go away when you move or sleep    · Difficulty moving or getting up from a seated position    · Difficulty with small movements, such as buttoning clothing or eating    · Decreased blinking and facial emotion    · Joint stiffness and jerky movements    · Difficulty keeping balance when standing or changing positions    · Shuffling or hunched position while walking    · Difficulty speaking and writing  Seek immediate care for the following symptoms:   · Feeling that you may hurt or kill yourself or others    · Feeling lightheaded, dizzy, or faint    · Chest pain or shortness of breath    · Weakness in an arm or leg    · Confusion or difficulty speaking    · Dizziness, a severe headache, or vision changes  Treatment for Parkinson disease  may include medicines to improve movement problems, such as muscle stiffness, twitches, and restlessness  Your healthcare provider may give you an injection to help your muscles relax  Deep brain stimulation surgery may be done to help decrease tremors and rigidity  Manage Parkinson disease:   · Avoid foods high in protein or dairy  They can cause problems with how some of your medicine works  Ask your healthcare provider how much protein and dairy is safe to eat  He may tell you to eat foods high in fiber to make it easier to have a bowel movement  Examples are cereals, beans, vegetables, and whole-grain breads  Ask if you need to be on a special diet  · Do not drive  unless your healthcare provider says it is okay      · Exercise regularly  A physical therapist teaches you exercises to help improve movement and strength, and to decrease pain  This may help you control your body movements, and keep your balance  · Go to occupational therapy  An occupational therapist teaches you skills to help with your daily activities  Your occupational therapist may help you choose equipment to help you at home and work  He can also suggest ways to keep your home and workplace safe  · Go to speech therapy  A speech therapist may work with you to help you talk or swallow  · Go to counseling  A mental health counselor will listen and teach you new ways to act towards things that bother you  Your family may attend meetings to learn new ways to take better care of both you and themselves  Follow up with your healthcare provider as directed:  Write down your questions so you remember to ask them during your visits  CARE AGREEMENT:   You have the right to help plan your care  Learn about your health condition and how it may be treated  Discuss treatment options with your caregivers to decide what care you want to receive  You always have the right to refuse treatment  The above information is an  only  It is not intended as medical advice for individual conditions or treatments  Talk to your doctor, nurse or pharmacist before following any medical regimen to see if it is safe and effective for you  © 2014 [address] is for End User's use only and may not be sold, redistributed or otherwise used for commercial purposes  All illustrations and images included in CareNotes® are the copyrighted property of [employer] M [employer]  or Danie Hickey  Hypertension   WHAT YOU NEED TO KNOW:   Hypertension is high blood pressure (BP)  Your BP is the force of your blood moving against the walls of your arteries  Normal BP is less than 120/80  Prehypertension is between 120/80 and 139/89   Hypertension is 140/90 or higher  Hypertension causes your BP to get so high that your heart has to work much harder than normal  This can damage your heart  You can control hypertension with a healthy lifestyle or medicines  A controlled blood pressure helps protect your organs, such as your heart, lungs, brain, and kidneys  DISCHARGE INSTRUCTIONS:   Call 911 for any of the following:   · You have discomfort in your chest that feels like squeezing, pressure, fullness, or pain  · You become confused or have difficulty speaking  · You suddenly feel lightheaded or have trouble breathing  · You have pain or discomfort in your back, neck, jaw, stomach, or arm  Seek care immediately if:   · You have a severe headache or vision loss  · You have weakness in an arm or leg  Contact your healthcare provider if:   · You feel faint, dizzy, confused, or drowsy  · You have been taking your BP medicine and your BP is still higher than your healthcare provider says it should be  · You have questions or concerns about your condition or care  Medicines: You may  need any of the following:  · Medicine  may be used to help lower your BP  You may need more than one type of medicine  Take the medicine exactly as directed  · Diuretics  help decrease extra fluid that collects in your body  This will help lower your BP  You may urinate more often while you take this medicine  · Cholesterol medicine  helps lower your cholesterol level  A low cholesterol level helps prevent heart disease and makes it easier to control your blood pressure  · Take your medicine as directed  Contact your healthcare provider if you think your medicine is not helping or if you have side effects  Tell him or her if you are allergic to any medicine  Keep a list of the medicines, vitamins, and herbs you take  Include the amounts, and when and why you take them  Bring the list or the pill bottles to follow-up visits   Carry your medicine list with music     · Limit alcohol  Women should limit alcohol to 1 drink a day  Men should limit alcohol to 2 drinks a day  A drink of alcohol is 12 ounces of beer, 5 ounces of wine, or 1½ ounces of liquor  · Do not smoke  Nicotine and other chemicals in cigarettes and cigars can increase your BP and also cause lung damage  Ask your healthcare provider for information if you currently smoke and need help to quit  E-cigarettes or smokeless tobacco still contain nicotine  Talk to your healthcare provider before you use these products  · Manage any other health conditions you have  Health conditions such as diabetes can increase your risk for hypertension  Follow your healthcare provider's instructions and take all your medicines as directed  © 2017 [address] Information is for End User's use only and may not be sold, redistributed or otherwise used for commercial purposes  All illustrations and images included in CareNotes® are the copyrighted property of [employer]  or Danie Hickey  The above information is an  only  It is not intended as medical advice for individual conditions or treatments  Talk to your doctor, nurse or pharmacist before following any medical regimen to see if it is safe and effective for you  A-fib (Atrial Fibrillation)   WHAT YOU NEED TO KNOW:   A-fib may come and go, or it may be a long-term condition  A-fib can cause blood clots, stroke, or heart failure  These conditions may become life-threatening  It is important to treat and manage a-fib to help prevent a blood clot, stroke, or heart failure          DISCHARGE INSTRUCTIONS:   Call 911 for any of the following:   · You have any of the following signs of a heart attack:      ¨ Squeezing, pressure, or pain in your chest that lasts longer than 5 minutes or returns    ¨ Discomfort or pain in your back, neck, jaw, stomach, or arm     ¨ Trouble breathing    ¨ Nausea or vomiting    ¨ Lightheadedness or a sudden cold sweat, especially with chest pain or trouble breathing    · You have any of the following signs of a stroke:      ¨ Numbness or drooping on one side of your face     ¨ Weakness in an arm or leg    ¨ Confusion or difficulty speaking    ¨ Dizziness, a severe headache, or vision loss  Seek care immediately if:  You have any of the following signs of a blood clot:  · You feel lightheaded, are short of breath, and have chest pain  · You cough up blood  · You have swelling, redness, pain, or warmth in your arm or leg  Contact your cardiologist or healthcare provider if:   · Your heart rate is higher than your healthcare provider said it should be  · You have new or worsening swelling in your legs, feet, ankles, or abdomen  · You are short of breath, even at rest      · You have questions or concerns about your condition or care  Medicines: You may need any of the following:  · Heart medicines  help control your heart rate and rhythm  You may need more than one medicine to treat your symptoms  · Blood thinners    help prevent blood clots  Examples of blood thinners include heparin and warfarin  Clots can cause strokes, heart attacks, and death  The following are general safety guidelines to follow while you are taking a blood thinner:    ¨ Watch for bleeding and bruising while you take blood thinners  Watch for bleeding from your gums or nose  Watch for blood in your urine and bowel movements  Use a soft washcloth on your skin, and a soft toothbrush to brush your teeth  This can keep your skin and gums from bleeding  If you shave, use an electric shaver  Do not play contact sports  ¨ Tell your dentist and other healthcare providers that you take anticoagulants  Wear a bracelet or necklace that says you take this medicine  ¨ Do not start or stop any medicines unless your healthcare provider tells you to   Many medicines cannot be used with blood thinners  ¨ Tell your healthcare provider right away if you forget to take the medicine, or if you take too much  ¨ Warfarin  is a blood thinner that you may need to take  The following are things you should be aware of if you take warfarin  § Foods and medicines can affect the amount of warfarin in your blood  Do not make major changes to your diet while you take warfarin  Warfarin works best when you eat about the same amount of vitamin K every day  Vitamin K is found in green leafy vegetables and certain other foods  Ask for more information about what to eat when you are taking warfarin  § You will need to see your healthcare provider for follow-up visits when you are on warfarin  You will need regular blood tests  These tests are used to decide how much medicine you need  · Antiplatelets , such as aspirin, help prevent blood clots  Take your antiplatelet medicine exactly as directed  These medicines make it more likely for you to bleed or bruise  If you are told to take aspirin, do not take acetaminophen or ibuprofen instead  · Take your medicine as directed  Contact your healthcare provider if you think your medicine is not helping or if you have side effects  Tell him or her if you are allergic to any medicine  Keep a list of the medicines, vitamins, and herbs you take  Include the amounts, and when and why you take them  Bring the list or the pill bottles to follow-up visits  Carry your medicine list with you in case of an emergency  Follow up with your cardiologist as directed: You will need regular blood tests and monitoring  Write down your questions so you remember to ask them during your visits  Manage A-fib:   · Know your target heart rate  Learn how to take your pulse and monitor your heart rate  · Manage other health conditions  This includes high blood pressure, sleep apnea, thyroid disease, diabetes, and other heart conditions   Take medicine as directed and follow your treatment plan  · Limit or do not drink alcohol  Alcohol can make a-fib hard to manage  Ask your healthcare provider if it is safe for you to drink alcohol  A drink of alcohol is 12 ounces of beer, 5 ounces of wine, or 1½ ounces of liquor  · Do not smoke  Nicotine and other chemicals in cigarettes and cigars can cause heart and lung damage  Ask your healthcare provider for information if you currently smoke and need help to quit  E-cigarettes or smokeless tobacco still contain nicotine  Talk to your healthcare provider before you use these products  · Eat heart-healthy foods  Heart healthy foods will help keep your cholesterol low  These include fruits, vegetables, whole-grain breads, low-fat dairy products, beans, lean meats, and fish  Replace butter and margarine with heart-healthy oils such as olive oil and canola oil  · Maintain a healthy weight  Ask your healthcare provider how much you should weigh  Ask him to help you create a weight loss plan if you are overweight  · Exercise for 30 minutes  most days of the week  Ask your healthcare provider about the best exercise plan for you  © 2017 [address] [facility] Information is for End User's use only and may not be sold, redistributed or otherwise used for commercial purposes  All illustrations and images included in CareNotes® are the copyrighted property of [employer]  or Danie Hickey  The above information is an  only  It is not intended as medical advice for individual conditions or treatments  Talk to your doctor, nurse or pharmacist before following any medical regimen to see if it is safe and effective for you

## 2020-01-31 NOTE — PHYSICAL THERAPY NOTE
Physical Therapy Evaluation    Patient Name: Dunia GARCIAECA'X Date: 1/31/2020     Problem List  Principal Problem:    Hypertensive urgency  Active Problems:    Parkinson disease ([facility] [address] )    Atrial fibrillation, permanent    Near syncope    Thoracic ascending aortic aneurysm [facility])    Renal mass       Past Medical History  Past Medical History:   Diagnosis Date    Parkinson's disease ([facility] [address] ) 01/2018        Past Surgical History  Past Surgical History:   Procedure Laterality Date    CARDIAC SURGERY      Pt had stent placed in 2003   [facility] END HEAD Right 12/28/2019    Procedure: OPEN REDUCTION W/ INTERNAL FIXATION (ORIF) HIP WITH Alyce Hameed;  Surgeon: Ignacio Hyman MD;  Location: [facility] MAIN OR;  Service: Orthopedics           01/31/20 0921   Note Type   Note type Eval/Treat   Pain Assessment   Pain Assessment No/denies pain   Pain Score No Pain   Home Living   Type of [address] One level;Performs ADLs on one level;Ramped entrance   Bathroom Shower/Tub Tub/shower unit   Bathroom Toilet Standard   Bathroom Equipment Shower chair   Bathroom Accessibility Accessible   Home Equipment Walker;Cane   Additional Comments pt uses RW at baseline   Prior Function   Level of [place] Independent with ADLs and functional mobility; Needs assistance with IADLs   Lives With Spouse   Receives Help From Family;Home health   ADL Assistance Independent   IADLs Needs assistance   Falls in the last 6 months 1 to 4   Vocational Retired   Comments pt currently receiving home nursing, PT, and OT   Restrictions/Precautions   Weight Bearing Precautions Per Order No   Other Precautions Fall Risk;Telemetry;Multiple lines; Chair Alarm   General   Family/Caregiver Present No   Cognition   Overall Cognitive Status WFL   Orientation Level Oriented X4   Following Commands Follows all commands and directions without difficulty   RLE Assessment   RLE Assessment WFL  (4+/5 grossly)   LLE Assessment   LLE Assessment WFL  (4+/5 grossly)   Bed Mobility   Additional Comments pt seated in chair at start/end of session   Transfers   Sit to Stand 5  Supervision   Additional items Armrests; Increased time required;Verbal cues   Stand to Sit 5  Supervision   Additional items Armrests; Increased time required;Verbal cues   Ambulation/Elevation   Gait pattern Excessively slow; Short stride; Foward flexed;Decreased foot clearance   Gait Assistance 5  Supervision   Additional items Verbal cues   Assistive Device Rolling walker   Distance 70'   Balance   Static Sitting Good   Dynamic Sitting Good   Static Standing Fair   Dynamic Standing [address] -  (with RW)   Endurance Deficit   Endurance Deficit Yes   Endurance Deficit Description pt limited by HR   Activity Tolerance   Activity Tolerance Patient limited by fatigue   Assessment   Prognosis Good   Problem List Decreased endurance; Impaired balance;Decreased mobility   Assessment Patient is a 78 y o  female evaluated by Physical Therapy s/p admit to [address] on 1/30/2020 with admitting diagnosis of: Syncopal episodes, Syncope, Thoracic aortic aneurysm, Renal cell cancer, and principal problem of: Hypertensive urgency  PT was consulted to assess patient's functional mobility and discharge needs  Ordered are PT Evaluation and treatment with activity level of: up with assistance  Comorbidities affecting patient's physical performance at time of assessment include: Parkinson disease, a-fib, thoracic AAA, hx of fall, hx of R femur fx  Personal factors affecting the patient at time of IE include: ambulating with assistive device, history of fall(s) and inability/difficulty performing IADLs  Please locate objective findings from PT assessment regarding body systems outlined above   Upon evaluation, pt able to perform all functional mobility with SUP and RW  No LOB experienced  Occasional verbal cuing provided  SpO2 remaining WFL, however pt's HR in a-fib and v-tach, so ambulation limited to 70'  Pt reports feeling fine and wanting to ambulate further, but in agreement with limiting exertion  RN made aware  Pt will benefit from continued PT intervention during LOS to address endurance deficits and facilitate safe d/c home when medically appropriate  D/c recommendation at this time is to continue with home PT  Goals   Patient Goals to go home today   LTG Expiration Date 02/14/20   Long Term Goal #1 Pt will perform all functional transfers and bed mobility with mod(I) and good safety awareness  Long Term Goal #2 Pt will ambulate 150' with RW and SUP while maintaining WFL HR and SpO2  Plan   Treatment/Interventions Endurance training;Bed mobility;Gait training;Functional transfer training   PT Frequency Other (Comment)  (3-5x/week)   Recommendation   Recommendation Home PT     Pt seated in chair at end of session with all needs in reach

## 2020-01-31 NOTE — DISCHARGE SUMMARY
Discharge Summary - [facility] Setting 78 y o  female MRN: [id number]    Unit/Bed#: 190-82 Encounter: [id number]    Admission Date:   Admission Orders (From admission, onward)     Ordered        01/30/20 1734  Place in Observation  Once                     Admitting Diagnosis: Syncopal episodes [R55]  Syncope [R55]  Thoracic aortic aneurysm ([address] ) [I71 2]  Renal cell cancer, left ([address] ) [C64 2]    HPI: The patient is a pleasant 38-year-old female with past medical history significant for chronic atrial fibrillation and a recent femoral head fracture presents to the ED after having a near syncopal episode witnessed by her   The patient's  and son are present at bedside and able to relate a good history along  The patient was seated eating a sticky bun when her  noted a flat affect and she was not responding, episode lasted approximately 30 seconds and the patient was leaning over and her  was able to sit her up  Episode lasted approximately 60 seconds with return to mental status without confusion or lethargy  Patient is not sure if she can't lately lost consciousness as she is able to relay some history, she is able to describe the episode as all the Strength leaving her body  Procedures Performed:   Orders Placed This Encounter   Procedures    ED ECG Documentation Only       Summary of Hospital Course:     1) Near syncope  2) Accelerated hypertension    Patient presents emergency room with complaints described above  She did not feel like she completely lost consciousness  Her blood pressure was significantly elevated and oral antihypertensives were resumed and Norvasc 5 mg p o  Daily was added  She was placed on q 4 hours neuro checks which were within normal limits  A 2D echocardiogram was requested and did not show any acute abnormality that with contribute to her presentation  A CTA dissection protocol was also obtained and results were discussed below      3) Thoracic AAA: Stable on CT from last year, will improve BP control as noted above    4) Renal mass:  Noted on CT scan, I discussed with the patient's grandson and  at bedside and they will follow-up urology as an outpatient after they consider whether they wish to pursue this or not  5) Left breast mass:  Patient has a known left breast mass and was established to follow-up with surgery, she has been delaying this she is unsure if she wishes to pursue treatment  Significant Findings, Care, Treatment and Services Provided:     CT   Stable ascending thoracic aortic aneurysm measuring 4 2 cm   No dissection  Stable cardiomegaly  Decreased size of left breast hematoma currently measuring 2 3 x 2 3 cm (previous 4 5 x 4 0 cm)  Solid enhancing mass in the left renal upper pole measuring 1 9 x 1 6 cm consistent with renal cell carcinoma  Small sclerotic lesion in the right anterior 3rd rib   Consider nuclear medicine whole body bone scan to exclude metastatic disease  Complications: none    Discharge Diagnosis: see above    Resolved Problems  Date Reviewed: 12/31/2019    None          Condition at Discharge: fair         Discharge instructions/Information to patient and family:   See after visit summary for information provided to patient and family  Provisions for Follow-Up Care:  See after visit summary for information related to follow-up care and any pertinent home health orders  PCP: Jeannette Brandt MD    Disposition: Home    Planned Readmission: No      Discharge Statement   I spent 21 minutes discharging the patient  This time was spent on the day of discharge  I had direct contact with the patient on the day of discharge  Additional documentation is required if more than 30 minutes were spent on discharge  Discharge Medications:  See after visit summary for reconciled discharge medications provided to patient and family

## 2020-01-31 NOTE — NURSING NOTE
Pt alert and oriented x4  Vss  Pt denies chest pain or sob at this time  Call bell within reach  Neuro assessment (-)  Will continue to monitor

## 2020-01-31 NOTE — OCCUPATIONAL THERAPY NOTE
Occupational Therapy Evaluation     Patient Name: Rod Pedro  IDPVB'B Date: 1/31/2020  Problem List  Principal Problem:    Hypertensive urgency  Active Problems:    Park[facility] disease ([facility] )    Atrial fibrillation, permanent    Near syncope    Thoracic ascending aortic aneurysm [facility])    Renal mass    Past Medical History  Past Medical History:   Diagnosis Date    Parkinson's disease ([facility] ) 01/2018     Past Surgical History  Past Surgical History:   Procedure Laterality Date    CARDIAC SURGERY      Pt had stent placed in 2003   [facility] END HEAD Right 12/28/2019    Procedure: OPEN REDUCTION W/ INTERNAL FIXATION (ORIF) HIP WITH CANNUALATED SCREWS;  Surgeon: Kalyn Christy MD;  Location: [facility] MAIN OR;  Service: Orthopedics             01/31/20 0920   Note Type   Note type Eval/Treat   Restrictions/Precautions   Weight Bearing Precautions Per Order No   Other Precautions Fall Risk;Telemetry   Pain Assessment   Pain Assessment No/denies pain   Home Living   Type of [address] One level;Performs ADLs on one level; Able to live on main level with bedroom/bathroom; Ramped entrance   Bathroom Shower/Tub Tub/shower unit   Bathroom Toilet Standard   Bathroom Equipment Shower chair   Bathroom Accessibility Accessible   Home Equipment Walker;Cane;Other (Comment)  (4ww)   Additional Comments pt reports use of RW at baseline during functional mobility   Prior Function   Level of [place] Independent with ADLs and functional mobility   Lives With Spouse   Receives Help From Family;Home health   ADL Assistance Independent   IADLs Needs assistance   Falls in the last 6 months 1 to 4   Vocational Retired   Comments pt receives home PT and family drives currently   Psychosocial   Psychosocial (WDL) WDL   Subjective   Subjective "I have been doing really well at home"   ADL   Where Assessed Chair   LB Dressing Assistance 5 Supervision/Setup   LB Dressing Deficit Don/doff L sock; Don/doff R sock   Additional Comments pt seated in chair and performs LB dressing   Bed Mobility   Additional Comments pt is seated in chair at start and end of session; SpO2 WFL and no complaints of SOB   Transfers   Sit to Stand 5  Supervision   Additional items Increased time required   Stand to Sit 5  Supervision   Additional items Increased time required   Additional Comments pt performed functional transfers with RW; pt with no LOB   Functional Mobility   Functional Mobility 5  Supervision   Additional Comments pt performed functional mobility with RW to mod (I) level with good functional endurance    Additional items Rolling walker   Balance   Static Sitting Good   Dynamic Sitting Good   Static Standing Fair   Dynamic Standing Fair -   Ambulatory Fair -   Activity Tolerance   Activity Tolerance Patient limited by fatigue   RUE Assessment   RUE Assessment WFL   LUE Assessment   LUE Assessment WFL   Hand Function   Gross Motor Coordination Functional   Fine Motor Coordination Functional   Sensation   Light Touch No apparent deficits   Sharp/Dull No apparent deficits   Cognition   Overall Cognitive Status WFL   Arousal/Participation Alert   Attention Within functional limits   Orientation Level Oriented X4   Memory Within functional limits   Following Commands Follows all commands and directions without difficulty   Assessment   Limitation Decreased ADL status; Decreased UE strength;Decreased Safe judgement during ADL;Decreased endurance;Decreased high-level ADLs; Decreased self-care trans   Assessment Pt is a 78 y o  female seen for OT evaluation s/p admit to [facility] on 1/30/2020 w/ Hypertensive urgency    Comorbidities affecting pt's functional performance at time of assessment include: PD  Personal factors affecting pt at time of IE include:steps to enter environment, difficulty performing ADLS, difficulty performing IADLS , limited insight into deficits, decreased initiation and engagement  and health management   Prior to admission, pt was (I) ADLs and (A) with IADLs with use of RW during functional mobility  Upon evaluation: Pt requires (S)-min (A) with use of RW during functional mobility 2* the following deficits impacting occupational performance: weakness, decreased strength, decreased balance, decreased tolerance, impaired initiation, decreased safety awareness and impaired interpersonal skills  Pt to benefit from continued skilled OT tx while in the hospital to address deficits as defined above and maximize level of functional independence w ADL's and functional mobility  Occupational Performance areas to address include: grooming, bathing/shower, toilet hygiene, dressing, functional mobility, community mobility and clothing management  From OT standpoint, recommendation at time of d/c would be home with home health services  Goals   Patient Goals to go home    Short Term Goal  pt will perform UE strengthening exercises    Long Term Goal #1 pt will perform UB/LB bathing and grooming tasks seated in chair at (I) levle    Long Term Goal #2 pt will perform toilet transfers and hygiene at (I) level   Long Term Goal pt will demonstrate UB/LB bathing and grooming tasks at (I) level    Plan   Treatment Interventions ADL retraining;Functional transfer training;UE strengthening/ROM; Endurance training;Patient/family training; Activityengagement   Goal Expiration Date 02/14/20   OT Frequency 3-5x/wk   Recommendation   OT Discharge Recommendation Home Health Agency   Barthel Index   Feeding 10   Bathing 0   Grooming Score 0   Dressing Score 5   Bladder Score 10   Bowels Score 10   Toilet Use Score 5   Transfers (Bed/Chair) Score 10   Mobility (Level Surface) Score 10   Stairs Score 0   Barthel Index Score 60     Pt will benefit from continued OT services in order to maximize (I) c ADL performance, FM c RW, and improve overall endurance/strength required to complete functional tasks in preparation for d/c  Pt left seated in chair at end of session; all needs within reach; all lines intact; scds connected and turned on

## 2020-01-31 NOTE — UTILIZATION REVIEW
Initial Clinical Review    Admission: Date/Time/Statement:    Admission Orders (From admission, onward)     Ordered        01/30/20 1734  Place in Observation  Once                   Orders Placed This Encounter   Procedures    Place in Observation     Standing Status:   Standing     Number of Occurrences:   1     Order Specific Question:   Admitting Physician     Answer:   Pastor Ayala     Order Specific Question:   Level of Care     Answer:   Med Surg [16]     ED Arrival Information     Expected Arrival Acuity Means of Arrival Escorted By Service Admission Type    - 1/30/2020 13:13 Urgent Wheelchair Family Member General Medicine Urgent    Arrival Complaint    syncopal episode        Chief Complaint   Patient presents with    Syncope     Around 1030 today patient was sitting with  having coffee when she had a witnessed episode of syncopy  Patient did not  hit her head  She recalls having a conversation and everything fading away but not the actual event  Patient has a hip surgery last week      Assessment/Plan:   68-year-old female presents with an episode of syncope  Patient was sitting at her kitchen table with her  she was having coffee as well as a sticky bone with nuts and all the sudden she had an abnormal sensation coming from her toes bilaterally all the way up to her head  She denies any palpitations she had no pain her  was able to grab her because she started the wean out of the chair there is no history of a fall out of the chair he describes her eyes being open but she was not responding to him she recalls him yelling at her she came around there is no history of any slurred speech she has no headache no tongue biting or bowel or bladder incontinence  Patient is not a diabetic  She had completed eating her sticky bone she denies any preceding lightheadedness there was no spinning sensation she denies any chest abdominal or back pain    There is no history of pleuritic pain   Patient underwent recent repair at the end of December of a right hip fracture she is currently ambulating with a walker the only medication adjustment was that her Xarelto dose was increased to cover for post-op hip surgery  she has no prior history of DVT or PE  She is previously on Xarelto for chronic afib  She does have some lingering swelling to the right leg but it is not worsened she is denying any nausea or vomiting she has had no diarrhea no history of melena or hematochezia she denies any dysuria or increased urinary frequency; no new trauma or falls   Near syncope  Assessment & Plan  Possibly vasovagal vs  Post-prandial syncope   Check orthostatic blood pressures  Follow-up CTA  dissection protocol  Trend troponin x2  Neurochecks q 4  2D echocardiogram     Hypertensive urgency  Assessment & Plan  Resume oral antihypertensive in the form of  Inderal 20 mg p o  Daily  Lasix 20 mg p o  Daily  HCTZ 12 5 mg p o  B i d   Utilize p r n   Labetalol for systolic blood pressure greater than 180  Will consider initiating oral Norvasc tomorrow morning if blood pressure does not improve     Atrial fibrillation, permanent  Assessment & Plan  With intermittent rapid ventricular response  Resume oral Inderal 20mg po daily  Check 2D echo  AC was Xarelto     Renal mass  Assessment & Plan  Per CT read, likely renal cell carcinoma  Will discuss findings with family in am and assess if they wish to further pursue this given advanced age      Thoracic ascending aortic aneurysm [facility])  Assessment & Plan  Stable on CT  Improve BP control      Parkinson disease [facility])  Assessment & Plan  Continue Sinemet    ED Triage Vitals   Temperature Pulse Respirations Blood Pressure SpO2   01/30/20 1325 01/30/20 1325 01/30/20 1325 01/30/20 1325 01/30/20 1325   (!) 97 1 °F (36 2 °C) 75 19 148/78 94 %      Temp Source Heart Rate Source Patient Position - Orthostatic VS BP Location FiO2 (%)   01/30/20 1325 01/30/20 1325 01/30/20 1325 01/30/20 1325 --   Temporal Monitor Sitting Left arm       Pain Score       01/30/20 1600       No Pain        Wt Readings from Last 1 Encounters:   01/30/20 67 3 kg (148 lb 5 9 oz)     Additional Vital Signs:   Date/Time  Temp  Pulse  Resp  BP  MAP (mmHg)  SpO2  O2 Device  Patient Position - Orthostatic VS   01/31/20 0904    82    150/78           01/31/20 07:32:40  97 9 °F (36 6 °C)  84  16  195/115Abnormal   142  98 %       01/30/20 1909    89    177/94Abnormal     99 %    Lying   01/30/20 18:39:22  98 4 °F (36 9 °C)  80  18  184/90Abnormal   121  99 %       01/30/20 1730    86  21  182/74Abnormal     92 %    Lying   Pertinent Labs/Diagnostic Test Results:   Results from last 7 days   Lab Units 01/31/20  0501 01/30/20  1431   WBC Thousand/uL 4 86 6 25   HEMOGLOBIN g/dL 10 7* 11 7   HEMATOCRIT % 34 1* 38 1   PLATELETS Thousands/uL 225 257   NEUTROS ABS Thousands/µL 2 55 3 79     Results from last 7 days   Lab Units 01/31/20  0501 01/30/20  1431   SODIUM mmol/L 138 137   POTASSIUM mmol/L 3 6 4 0   CHLORIDE mmol/L 104 102   CO2 mmol/L 26 29   ANION GAP mmol/L 8 6   BUN mg/dL 16 17   CREATININE mg/dL 0 75 0 79   EGFR ml/min/1 73sq m 75 70   CALCIUM mg/dL 9 7 10 2*   MAGNESIUM mg/dL 2 1 2 1     Results from last 7 days   Lab Units 01/31/20  0501 01/30/20  1431   AST U/L 14 15   ALT U/L <6* 10*   ALK PHOS U/L 91 105   TOTAL PROTEIN g/dL 6 5 7 5   ALBUMIN g/dL 2 7* 3 3*   TOTAL BILIRUBIN mg/dL 0 50 0 50     Results from last 7 days   Lab Units 01/31/20  0501 01/30/20  1431   GLUCOSE RANDOM mg/dL 82 81     Results from last 7 days   Lab Units 01/30/20  2231 01/30/20 2013 01/30/20  1431   TROPONIN I ng/mL 0 03 0 02 0 03     Results from last 7 days   Lab Units 01/30/20  1431   D-DIMER QUANTITATIVE ug/ml FEU 1 20*     Results from last 7 days   Lab Units 01/30/20  1431   TSH 3RD GENERATON uIU/mL 2 574     Results from last 7 days   Lab Units 01/30/20  1431   NT-PRO BNP pg/mL 1,368*     Results from last 7 days   Lab Units 01/30/20  1433   CLARITY UA  Clear   COLOR UA  Yellow   SPEC GRAV UA  1 010   PH UA  6 0   GLUCOSE UA mg/dl Negative   KETONES UA mg/dl Negative   BLOOD UA  Negative   PROTEIN UA mg/dl Negative   NITRITE UA  Negative   BILIRUBIN UA  Negative   UROBILINOGEN UA E U /dl 0 2   LEUKOCYTES UA  Negative     1/30  Cxr= No acute cardiopulmonary disease  Ct head=1  No acute intracranial CT abnormality  2   Age related cerebral atrophy and mild chronic white matter microangiopathy  3   Mild right maxillary sinus disease  No fluid level  ble venous duplex=RIGHT LOWER LIMB  No evidence of acute or chronic deep vein thrombosis   No evidence of superficial thrombophlebitis noted  Doppler evaluation shows a normal response to augmentation maneuvers  Popliteal, posterior tibial and anterior tibial arterial Doppler waveforms are triphasic  LEFT LOWER LIMB LIMITED  Evaluation shows no evidence of thrombus in the common femoral vein  Doppler evaluation shows a normal response to augmentation maneuvers  cta chest, a/p=Stable ascending thoracic aortic aneurysm measuring 4 2 cm  No dissection  Stable cardiomegaly  Decreased size of left breast hematoma currently measuring 2 3 x 2 3 cm (previous 4 5 x 4 0 cm)  Solid enhancing mass in the left renal upper pole measuring 1 9 x 1 6 cm consistent with renal cell carcinoma  Small sclerotic lesion in the right anterior 3rd rib    Consider nuclear medicine whole body bone scan to exclude metastatic disease   ekg=Rhythm: sinus rhythm    Ectopy:     Ectopy: PVCs    QRS:     QRS axis:  Normal  Comments:      No acute ischemic changes  ED Treatment:   Medication Administration from 01/30/2020 1313 to 01/30/2020 1810       Date/Time Order Dose Route     01/30/2020 1715 iohexol (OMNIPAQUE) 350 MG/ML injection (MULTI-DOSE) 100 mL 100 mL Intravenous        Past Medical History:   Diagnosis Date    Parkinson's disease ([facility] ) 01/2018     Present on Admission:   Hypertensive urgency   [facility] disease ([facility] )   Atrial fibrillation, permanent  Admitting Diagnosis: Syncopal episodes [R55]  Syncope [R55]  Thoracic aortic aneurysm (HCC) [I71 2]  [facility] cancer, [facility] ([facility] ) [C64 2]  Age/Sex: 78 y o  female  Admission Orders:  Telemetry  Pt/ot eval & tx  Scd/foot pumps  Orthostatic bp  neuro checks q4h  Scheduled Medications:  amLODIPine 5 mg Oral Daily   carbidopa-levodopa 1 tablet Oral TID   furosemide 20 mg Oral Daily   hydrochlorothiazide 12 5 mg Oral Daily   propranolol 20 mg Oral BID   rivaroxaban 20 mg Oral Daily With Breakfast   timolol 1 drop Both Eyes BID     PRN Meds:  Labetalol HCl 10 mg Intravenous Q6H PRN     Network Utilization Review Department  [email or web address]  ATTENTION: Please call with any questions or concerns to [phone] and carefully listen to the prompts so that you are directed to the right person  All voicemails are confidential   [place] all requests for admission clinical reviews, approved or denied determinations and any other requests to dedicated fax number below belonging to the campus where the patient is receiving treatment   List of dedicated fax numbers for the Facilities:  [address] [place] [address] DENIALS (Administrative/Medical Necessity) [phone]   [address] (Maternity/NICU/Pediatrics) [phone]   [facility] [phone]   Jakub Melton [phone]   [facility] [place] [phone]   Newrenzo Sauer [phone]   [address] [facility] [address] [facility] [phone]   Aries Rodriguez [phone]   [address] [facility] [address]  [address] [facility] [address] [facility] [phone]

## 2020-01-31 NOTE — SOCIAL WORK
Cm met with the patient to evaluate the patients prior function and living situation and any barriers to d/c and form a safe d/c plan  Cm also evaluated the patient for any services in the home or needs for services  Pt resides at home with her  in a 1 story home  Has 5 NATALYA (also a ramp to get into the home)  Pt has a walker, shower chair and BP cuff at home  Re: [facility] active with [facility] (RN, PT and OT)  Pt with a recent stay on the 5th floor for STR from 1/6-1/17/20)  Pt's family does the driving  PCP is Luli Goetz (pt has an appointment on 2/19/20)  Pharmacy is [facility] in [place]  Plans at this time are home on dc with OP follow up and resumption of VNA services  CM will follow and assist in dc planning

## 2020-01-31 NOTE — PLAN OF CARE
Problem: PHYSICAL THERAPY ADULT  Goal: Performs mobility at highest level of function for planned discharge setting  See evaluation for individualized goals  Description  Treatment/Interventions: Endurance training, Bed mobility, Gait training, Functional transfer training          See flowsheet documentation for full assessment, interventions and recommendations  Note:   Prognosis: Good  Problem List: Decreased endurance, Impaired balance, Decreased mobility  Assessment: Patient is a 78 y o  female evaluated by Physical Therapy s/p admit to [facility] on 1/30/2020 with admitting diagnosis of: Syncopal episodes, Syncope, Thoracic aortic aneurysm, Renal cell cancer, and principal problem of: Hypertensive urgency  PT was consulted to assess patient's functional mobility and discharge needs  Ordered are PT Evaluation and treatment with activity level of: up with assistance  Comorbidities affecting patient's physical performance at time of assessment include: Parkinson disease, a-fib, thoracic AAA, hx of fall, hx of R femur fx  Personal factors affecting the patient at time of IE include: ambulating with assistive device, history of fall(s) and inability/difficulty performing IADLs  Please locate objective findings from PT assessment regarding body systems outlined above  Upon evaluation, pt able to perform all functional mobility with SUP and RW  No LOB experienced  Occasional verbal cuing provided  SpO2 remaining WFL, however pt's HR in a-fib and v-tach, so ambulation limited to 70'  Pt reports feeling fine and wanting to ambulate further, but in agreement with limiting exertion  RN made aware  Pt will benefit from continued PT intervention during LOS to address endurance deficits and facilitate safe d/c home when medically appropriate  D/c recommendation at this time is to continue with home PT  Recommendation: Home PT          See flowsheet documentation for full assessment

## 2020-02-01 LAB
ATRIAL RATE: 220 BPM
QRS AXIS: -3 DEGREES
QRSD INTERVAL: 108 MS
QT INTERVAL: 424 MS
QTC INTERVAL: 470 MS
T WAVE AXIS: -11 DEGREES
VENTRICULAR RATE: 74 BPM

## 2020-02-01 PROCEDURE — 93010 ELECTROCARDIOGRAM REPORT: CPT | Performed by: INTERNAL MEDICINE

## 2020-02-14 ENCOUNTER — APPOINTMENT (OUTPATIENT)
Dept: RADIOLOGY | Facility: MEDICAL CENTER | Age: 80
End: 2020-02-14
Payer: COMMERCIAL

## 2020-02-14 ENCOUNTER — OFFICE VISIT (OUTPATIENT)
Dept: OBGYN CLINIC | Facility: CLINIC | Age: 80
End: 2020-02-14

## 2020-02-14 VITALS
BODY MASS INDEX: 28.98 KG/M2 | SYSTOLIC BLOOD PRESSURE: 124 MMHG | HEART RATE: 76 BPM | DIASTOLIC BLOOD PRESSURE: 77 MMHG | HEIGHT: 60 IN

## 2020-02-14 DIAGNOSIS — Z87.81 STATUS POST CLOSED FRACTURE OF RIGHT HIP: ICD-10-CM

## 2020-02-14 DIAGNOSIS — M17.11 PRIMARY OSTEOARTHRITIS OF RIGHT KNEE: ICD-10-CM

## 2020-02-14 DIAGNOSIS — Z87.81 STATUS POST CLOSED FRACTURE OF RIGHT HIP: Primary | ICD-10-CM

## 2020-02-14 PROCEDURE — 73502 X-RAY EXAM HIP UNI 2-3 VIEWS: CPT

## 2020-02-14 PROCEDURE — 99024 POSTOP FOLLOW-UP VISIT: CPT | Performed by: ORTHOPAEDIC SURGERY

## 2020-02-14 NOTE — PROGRESS NOTES
Chief Complaint   ORIF right hip fracture December 28th, 2019   right knee pain    History Of Presenting Illness  Eleni Morris 1940 presents with  For arthritis fracture December 28th, 2019 with cannulated screws  Patient presents for evaluation with x-rays  Patient's back at home ambulating independently with a walker  Patient gets occasional episodes of groin discomfort   right knee pain for many years gradually getting worse  Current Medications  Current Outpatient Medications   Medication Sig Dispense Refill    amLODIPine (NORVASC) 5 mg tablet Take 1 tablet (5 mg total) by mouth daily 30 tablet 0    carbidopa-levodopa (SINEMET)  mg per tablet Take 1 tablet by mouth Three times a day      ergocalciferol (VITAMIN D2) 50,000 units Take 50,000 Units by mouth once a week       ferrous sulfate 325 (65 Fe) mg tablet Take 325 mg by mouth daily with breakfast      furosemide (LASIX) 20 mg tablet Take 20 mg by mouth daily      hydrochlorothiazide (MICROZIDE) 12 5 mg capsule Take 12 5 mg by mouth daily       Omega-3 Fatty Acids (FISH OIL) 1,000 mg Take 1,000 mg by mouth daily      propranolol (INDERAL) 20 mg tablet Take 20 mg by mouth 2 (two) times a day      rivaroxaban (XARELTO) 20 mg tablet Take 20 mg by mouth 2 (two) times a day       timolol (BETIMOL) 0 5 % ophthalmic solution 1 drop 2 (two) times a day       No current facility-administered medications for this visit          Current Problems    Active Problems:   Patient Active Problem List    Diagnosis Date Noted    Near syncope 01/30/2020    Thoracic ascending aortic aneurysm ([facility] ) 01/30/2020    Renal mass 01/30/2020    Atrial fibrillation, permanent 12/28/2019    Hypokalemia 12/28/2019    Hypertensive urgency 12/27/2019    Parkinson disease ([facility] ) 12/27/2019    Acute cystitis without hematuria 12/27/2019    Fall from slip, trip, or stumble, initial encounter 12/27/2019    Closed displaced fracture of right femoral neck ([facility] ) 12/27/2019         Review of Systems:    General: negative for - chills, fatigue, fever,  weight gain or weight loss  Psychological: negative for - anxiety, behavioral disorder, concentration difficulties  Ophthalmic: negative for - blurry vision, decreased vision, double vision,      Past Medical History:   Past Medical History:   Diagnosis Date    Parkinson's disease ([facility] ) 01/2018       Past Surgical History:   Past Surgical History:   Procedure Laterality Date    CARDIAC SURGERY      Pt had stent placed in 2003   [address] Right 12/28/2019    Procedure: OPEN REDUCTION W/ INTERNAL FIXATION (ORIF) HIP WITH CANNUALATED SCREWS;  Surgeon: Shaila Moody MD;  Location: [facility];  Service: Orthopedics       Family History:  Family history reviewed and non-contributory  History reviewed  No pertinent family history      Social History:  Social History     Socioeconomic History    Marital status: /Civil Union     Spouse name: None    Number of children: None    Years of education: None    Highest education level: None   Occupational History    None   Social Needs    Financial resource strain: None    Food insecurity:     Worry: None     Inability: None    Transportation needs:     Medical: None     Non-medical: None   Tobacco Use    Smoking status: Never Smoker    Smokeless tobacco: Never Used   Substance and Sexual Activity    Alcohol use: Yes     Frequency: 2-4 times a month     Drinks per session: 1 or 2     Binge frequency: Never    Drug use: Never    Sexual activity: Not Currently   Lifestyle    Physical activity:     Days per week: None     Minutes per session: None    Stress: None   Relationships    Social connections:     Talks on phone: None     Gets together: None     Attends Jewish service: None     Active member of club or organization: None     Attends meetings of clubs or organizations: None Relationship status: None    Intimate partner violence:     Fear of current or ex partner: None     Emotionally abused: None     Physically abused: None     Forced sexual activity: None   Other Topics Concern    None   Social History Narrative    None       Allergies:   No Known Allergies        Physical ExaminationBP 124/77 (BP Location: Left arm, Patient Position: Sitting, Cuff Size: Standard)   Pulse 76   Ht 5' (1 524 m)   BMI 28 98 kg/m²   Gen: Alert and oriented to person, place, time  HEENT: EOMI, eyes clear, moist mucus membranes, hearing intact      Orthopedic Exam   right hip examination   incision well healed no evidence of infection no distal deficits calf soft non-tender   pain-free right hip movements radiographs satisfactory except for 1 screw which was very very close to the joint line but not through yet     right knee no effusion 3-100 flexion with joint line tenderness and crepitus    Impression   stable status post ORIF right hip fracture   right knee osteoarthritis        Plan     patient allowed all activities as tolerated follow-up in 6 weeks x-ray right hip on arrival AP lateral   patient might require single screw backout or removal if it migrates any further   knee brace right knee x-ray right knee next visit possible steroid or hyaluronic acid injections at that point  Astrid Romero MD        Portions of the record may have been created with voice recognition software  Occasional wrong word or "sound a like" substitutions may have occurred due to the inherent limitations of voice recognition software  Read the chart carefully and recognize, using context, where substitutions have occurred

## 2020-02-17 ENCOUNTER — OFFICE VISIT (OUTPATIENT)
Dept: UROLOGY | Facility: CLINIC | Age: 80
End: 2020-02-17
Payer: COMMERCIAL

## 2020-02-17 VITALS
WEIGHT: 146 LBS | SYSTOLIC BLOOD PRESSURE: 130 MMHG | DIASTOLIC BLOOD PRESSURE: 88 MMHG | HEART RATE: 76 BPM | BODY MASS INDEX: 28.51 KG/M2

## 2020-02-17 DIAGNOSIS — N28.89 LEFT RENAL MASS: Primary | ICD-10-CM

## 2020-02-17 PROCEDURE — 99213 OFFICE O/P EST LOW 20 MIN: CPT | Performed by: UROLOGY

## 2020-02-17 NOTE — PROGRESS NOTES
[address] [facility]  [facility]  [address] [facility]  [place], [address] [place] [address]  [phone]  www  [facility]  org      NAME: Williams Ramirez  AGE: 78 y o  SEX: female  : 1940   MRN: [id number]    DATE: 2020  TIME: 12:05 PM    Assessment and Plan:    Left upper pole renal mass, 1 9 cm consistent with renal cell carcinoma  We discussed the options of partial nephrectomy, cryotherapy and observation and I strongly recommend observation  Follow-up 6 months with a renal ultrasound  The chances of this growing significantly or metastasizing are very small  Chief Complaint     Chief Complaint   Patient presents with    renal mass       History of Present Illness   New patient office visit:  47-year-old woman with a solid enhancing mass in the left upper pole measuring 1 9 cm consistent with renal cell carcinoma  I have personally reviewed her CT scan images  We all reviewed the images together  She is having no symptoms  She is also in the process of having a breast mass worked up  The following portions of the patient's history were reviewed and updated as appropriate: allergies, current medications, past family history, past medical history, past social history, past surgical history and problem list   Past Medical History:   Diagnosis Date    Parkinson's disease ([facility] ) 2018     Past Surgical History:   Procedure Laterality Date    CARDIAC SURGERY      Pt had stent placed in    [address] Right 2019    Procedure: OPEN REDUCTION W/ INTERNAL FIXATION (ORIF) [address];  Surgeon: Eva Rosado MD;  Location: [facility];  Service: Orthopedics     shoulder  Review of Systems   Review of Systems   Genitourinary: Negative          Active Problem List     Patient Active Problem List   Diagnosis    Hypertensive urgency    Parkinson disease ([facility]    Acute cystitis without hematuria    Fall from slip, trip, or stumble, initial encounter    Atrial fibrillation, permanent    Hypokalemia    Closed displaced fracture of right femoral neck (HCC)    Near syncope    Thoracic ascending aortic aneurysm (HCC)    Renal mass       Objective   /88   Pulse 76   Wt 66 2 kg (146 lb)   BMI 28 51 kg/m²     Physical Exam   Constitutional: She is oriented to person, place, and time  She appears well-developed and well-nourished  HENT:   Head: Normocephalic and atraumatic  Eyes: EOM are normal    Neck: Normal range of motion  Pulmonary/Chest: Effort normal    Musculoskeletal:   Walks with walker due to hip fracture   Neurological: She is alert and oriented to person, place, and time  Skin: Skin is warm and dry  Psychiatric: She has a normal mood and affect   Her behavior is normal  Judgment and thought content normal            Current Medications     Current Outpatient Medications:     amLODIPine (NORVASC) 5 mg tablet, Take 1 tablet (5 mg total) by mouth daily, Disp: 30 tablet, Rfl: 0    carbidopa-levodopa (SINEMET)  mg per tablet, Take 1 tablet by mouth Three times a day, Disp: , Rfl:     ergocalciferol (VITAMIN D2) 50,000 units, Take 50,000 Units by mouth once a week , Disp: , Rfl:     ferrous sulfate 325 (65 Fe) mg tablet, Take 325 mg by mouth daily with breakfast, Disp: , Rfl:     furosemide (LASIX) 20 mg tablet, Take 20 mg by mouth daily, Disp: , Rfl:     hydrochlorothiazide (MICROZIDE) 12 5 mg capsule, Take 12 5 mg by mouth daily , Disp: , Rfl:     Omega-3 Fatty Acids (FISH OIL) 1,000 mg, Take 1,000 mg by mouth daily, Disp: , Rfl:     propranolol (INDERAL) 20 mg tablet, Take 20 mg by mouth 2 (two) times a day, Disp: , Rfl:     rivaroxaban (XARELTO) 20 mg tablet, Take 20 mg by mouth 2 (two) times a day , Disp: , Rfl:     timolol (BETIMOL) 0 5 % ophthalmic solution, 1 drop 2 (two) times a day, Disp: , Rfl: Ekaterina Garrido MD

## 2020-03-03 DIAGNOSIS — M17.11 PRIMARY OSTEOARTHRITIS OF RIGHT KNEE: Primary | ICD-10-CM

## 2020-05-20 ENCOUNTER — OFFICE VISIT (OUTPATIENT)
Dept: OBGYN CLINIC | Facility: CLINIC | Age: 80
End: 2020-05-20
Payer: COMMERCIAL

## 2020-05-20 ENCOUNTER — APPOINTMENT (OUTPATIENT)
Dept: RADIOLOGY | Facility: MEDICAL CENTER | Age: 80
End: 2020-05-20
Payer: COMMERCIAL

## 2020-05-20 VITALS
SYSTOLIC BLOOD PRESSURE: 131 MMHG | DIASTOLIC BLOOD PRESSURE: 76 MMHG | HEIGHT: 60 IN | TEMPERATURE: 98.2 F | BODY MASS INDEX: 28.86 KG/M2 | HEART RATE: 71 BPM | WEIGHT: 147 LBS

## 2020-05-20 DIAGNOSIS — Z87.81 STATUS POST CLOSED FRACTURE OF RIGHT HIP: Primary | ICD-10-CM

## 2020-05-20 DIAGNOSIS — Z87.81 STATUS POST CLOSED FRACTURE OF RIGHT HIP: ICD-10-CM

## 2020-05-20 DIAGNOSIS — M17.11 PRIMARY OSTEOARTHRITIS OF RIGHT KNEE: ICD-10-CM

## 2020-05-20 PROCEDURE — 99213 OFFICE O/P EST LOW 20 MIN: CPT | Performed by: ORTHOPAEDIC SURGERY

## 2020-05-20 PROCEDURE — 73502 X-RAY EXAM HIP UNI 2-3 VIEWS: CPT

## 2020-07-28 ENCOUNTER — TELEPHONE (OUTPATIENT)
Dept: UROLOGY | Facility: MEDICAL CENTER | Age: 80
End: 2020-07-28

## 2020-07-28 NOTE — TELEPHONE ENCOUNTER
Patient managed by Dr Anni Hays, patient called in stating she is experiencing frequent urination especially whenever she stands up she has to go right away  Patient does have a appointment scheduled on 8/17, she would like to know if she can be seen sooner    Patient can be reached at [phone]

## 2020-07-28 NOTE — TELEPHONE ENCOUNTER
Called and spoke with patient  Informed patient that there are no sooner openings  Patient will keep 8/17/20 appointment

## 2020-08-11 ENCOUNTER — TELEPHONE (OUTPATIENT)
Dept: UROLOGY | Facility: MEDICAL CENTER | Age: 80
End: 2020-08-11

## 2020-08-11 NOTE — TELEPHONE ENCOUNTER
Called and spoke with patient  Informed patient that it is ok if she cannot hold her urine that long

## 2020-08-11 NOTE — TELEPHONE ENCOUNTER
Patient managed by Dr Delio Christine   Patient called in stating she is scheduled for US on 8/11/2020, patient is concerned because she has frequent urination problems and hwo she would get through the test   Patient can be reached at [phone]

## 2020-08-14 ENCOUNTER — HOSPITAL ENCOUNTER (OUTPATIENT)
Dept: ULTRASOUND IMAGING | Facility: HOSPITAL | Age: 80
Discharge: HOME | End: 2020-08-14
Attending: UROLOGY
Payer: COMMERCIAL

## 2020-08-14 DIAGNOSIS — N28.89 LEFT RENAL MASS: ICD-10-CM

## 2020-08-14 PROCEDURE — 76770 US EXAM ABDO BACK WALL COMP: CPT

## 2020-10-13 ENCOUNTER — OFFICE VISIT (OUTPATIENT)
Dept: UROLOGY | Facility: CLINIC | Age: 80
End: 2020-10-13
Payer: COMMERCIAL

## 2020-10-13 VITALS — TEMPERATURE: 97.5 F | DIASTOLIC BLOOD PRESSURE: 74 MMHG | SYSTOLIC BLOOD PRESSURE: 126 MMHG | HEART RATE: 72 BPM

## 2020-10-13 DIAGNOSIS — N28.89 LEFT RENAL MASS: Primary | ICD-10-CM

## 2020-10-13 PROCEDURE — 99213 OFFICE O/P EST LOW 20 MIN: CPT | Performed by: UROLOGY

## 2021-08-05 ENCOUNTER — TELEPHONE (OUTPATIENT)
Dept: UROLOGY | Facility: CLINIC | Age: 81
End: 2021-08-05

## 2021-08-05 NOTE — TELEPHONE ENCOUNTER
Reschedule Letter mailed to patient to change appointment from MD to AP/ NP due to schedule changes ( New Office Model)

## 2021-10-15 ENCOUNTER — HOSPITAL ENCOUNTER (OUTPATIENT)
Dept: ULTRASOUND IMAGING | Facility: HOSPITAL | Age: 81
Discharge: HOME | End: 2021-10-15
Attending: UROLOGY
Payer: COMMERCIAL

## 2021-10-15 DIAGNOSIS — N28.89 LEFT RENAL MASS: ICD-10-CM

## 2021-10-15 PROCEDURE — 76770 US EXAM ABDO BACK WALL COMP: CPT

## 2021-11-11 ENCOUNTER — OFFICE VISIT (OUTPATIENT)
Dept: UROLOGY | Facility: CLINIC | Age: 81
End: 2021-11-11
Payer: COMMERCIAL

## 2021-11-11 VITALS
SYSTOLIC BLOOD PRESSURE: 136 MMHG | DIASTOLIC BLOOD PRESSURE: 80 MMHG | BODY MASS INDEX: 26.56 KG/M2 | WEIGHT: 136 LBS | HEART RATE: 76 BPM

## 2021-11-11 DIAGNOSIS — N28.89 RENAL MASS: ICD-10-CM

## 2021-11-11 DIAGNOSIS — N28.89 LEFT RENAL MASS: Primary | ICD-10-CM

## 2021-11-11 PROBLEM — N39.41 URGE INCONTINENCE: Status: ACTIVE | Noted: 2021-11-11

## 2021-11-11 PROCEDURE — 99212 OFFICE O/P EST SF 10 MIN: CPT | Performed by: NURSE PRACTITIONER

## 2022-06-13 ENCOUNTER — TELEPHONE (OUTPATIENT)
Dept: OBGYN CLINIC | Facility: HOSPITAL | Age: 82
End: 2022-06-13

## 2023-06-26 ENCOUNTER — HOSPITAL ENCOUNTER (EMERGENCY)
Facility: HOSPITAL | Age: 83
Discharge: HOME | End: 2023-06-26
Attending: EMERGENCY MEDICINE
Payer: COMMERCIAL

## 2023-06-26 ENCOUNTER — APPOINTMENT (EMERGENCY)
Dept: RADIOLOGY | Facility: HOSPITAL | Age: 83
End: 2023-06-26
Payer: COMMERCIAL

## 2023-06-26 VITALS
SYSTOLIC BLOOD PRESSURE: 190 MMHG | OXYGEN SATURATION: 96 % | DIASTOLIC BLOOD PRESSURE: 84 MMHG | TEMPERATURE: 97.6 F | HEART RATE: 64 BPM | RESPIRATION RATE: 18 BRPM

## 2023-06-26 DIAGNOSIS — M25.551 RIGHT HIP PAIN: Primary | ICD-10-CM

## 2023-06-26 PROCEDURE — 73502 X-RAY EXAM HIP UNI 2-3 VIEWS: CPT

## 2023-06-26 RX ORDER — ACETAMINOPHEN 325 MG/1
650 TABLET ORAL ONCE
Status: COMPLETED | OUTPATIENT
Start: 2023-06-26 | End: 2023-06-26

## 2023-06-26 RX ADMIN — ACETAMINOPHEN 650 MG: 325 TABLET, FILM COATED ORAL at 21:14

## 2023-06-27 NOTE — ED PROVIDER NOTES
History  Chief Complaint   Patient presents with   • Hip Pain     Right sided hip pain, had pins placed roughly three years ago in the same hip  Feels a pinching sensation in the hip since last week  This afternoon she went to put weight onto leg and had a lot of pain  Patient presents to the emergency department today with her daughter  This is a very pleasant 78-year-old female who states she is experiencing pain in the right hip region  She did have a traumatic fracture in the right hip region December 2019 which was repaired here with a few fixation screws as she explains it  She states she did some short-term rehab and was asymptomatic until earlier today when she started experiencing what she describes as a pinching sensation near the surgical site  She states she stood up and had pain in the right hip and came here for evaluation she did not take any over-the-counter medication  She is well-appearing at bedside no history of any blunt or penetrating trauma  No other arthralgias or myalgias  Prior to Admission Medications   Prescriptions Last Dose Informant Patient Reported? Taking?    Omega-3 Fatty Acids (FISH OIL) 1,000 mg   Yes No   Sig: Take 1,000 mg by mouth daily   Patient not taking: Reported on 11/11/2021    amLODIPine (NORVASC) 5 mg tablet   No No   Sig: Take 1 tablet (5 mg total) by mouth daily   carbidopa-levodopa (SINEMET)  mg per tablet   Yes No   Sig: Take 1 tablet by mouth Three times a day   ergocalciferol (VITAMIN D2) 50,000 units   Yes No   Sig: Take 50,000 Units by mouth once a week    ferrous sulfate 325 (65 Fe) mg tablet   Yes No   Sig: Take 325 mg by mouth daily with breakfast   furosemide (LASIX) 20 mg tablet  Self Yes No   Sig: Take 20 mg by mouth daily   hydrochlorothiazide (MICROZIDE) 12 5 mg capsule   Yes No   Sig: Take 12 5 mg by mouth daily    propranolol (INDERAL) 20 mg tablet   Yes No   Sig: Take 20 mg by mouth 2 (two) times a day   rivaroxaban (Arletta Duncan) 20 mg tablet   Yes No   Sig: Take 20 mg by mouth 2 (two) times a day    timolol (BETIMOL) 0 5 % ophthalmic solution   Yes No   Si drop 2 (two) times a day   Patient not taking: Reported on 2021       Facility-Administered Medications: None       Past Medical History:   Diagnosis Date   • Hypertension    • Parkinson's disease ([facility] ) 2018       Past Surgical History:   Procedure Laterality Date   • BREAST MASS EXCISION     • CARDIAC SURGERY      Pt had stent placed in    • CHOLECYSTECTOMY         • HYSTERECTOMY     • [place] OPEN [address] END HEAD Right 2019    Procedure: OPEN REDUCTION W/ INTERNAL FIXATION (ORIF) HIP WITH CANNUALATED SCREWS;  Surgeon: Willem Wisdom MD;  Location: [facility];  Service: Orthopedics       History reviewed  No pertinent family history  I have reviewed and agree with the history as documented  E-Cigarette/Vaping   • E-Cigarette Use Never User      E-Cigarette/Vaping Substances   • Nicotine No    • THC No    • CBD No    • Flavoring No    • Other No    • Unknown No      Social History     Tobacco Use   • Smoking status: Never   • Smokeless tobacco: Never   Vaping Use   • Vaping Use: Never used   Substance Use Topics   • Alcohol use: Not Currently   • Drug use: Never       Review of Systems   Constitutional: Negative for chills and fever  HENT: Negative for ear pain and sore throat  Eyes: Negative for pain and visual disturbance  Respiratory: Negative for cough and shortness of breath  Cardiovascular: Negative for chest pain and palpitations  Gastrointestinal: Negative for abdominal pain and vomiting  Genitourinary: Negative for dysuria and hematuria  Musculoskeletal: Negative for arthralgias and back pain  Right hip pain   Skin: Negative for color change and rash  Neurological: Negative for seizures and syncope  All other systems reviewed and are negative  Physical Exam  Physical Exam  Vitals reviewed  Constitutional:       General: She is not in acute distress  Appearance: Normal appearance  She is not ill-appearing, toxic-appearing or diaphoretic  HENT:      Head: Normocephalic and atraumatic  Right Ear: External ear normal       Left Ear: External ear normal    Eyes:      General: No scleral icterus  Right eye: No discharge  Left eye: No discharge  Extraocular Movements: Extraocular movements intact  Conjunctiva/sclera: Conjunctivae normal    Cardiovascular:      Rate and Rhythm: Normal rate  Pulses: Normal pulses  Pulmonary:      Effort: Pulmonary effort is normal  No respiratory distress  Breath sounds: No stridor  Musculoskeletal:         General: No deformity or signs of injury  Cervical back: Normal range of motion  No rigidity  Comments: There is tenderness over an old surgical incision site however there is no evidence of wound dehiscence there is no actual true hip tenderness the tenderness is distal to this region  There is no knee tenderness  Skin:     General: Skin is warm  Coloration: Skin is not jaundiced  Findings: No lesion or rash  Neurological:      General: No focal deficit present  Mental Status: She is alert and oriented to person, place, and time  Mental status is at baseline  Gait: Gait normal    Psychiatric:         Mood and Affect: Mood normal          Thought Content:  Thought content normal          Judgment: Judgment normal          Vital Signs  ED Triage Vitals [06/26/23 1950]   Temperature Pulse Respirations Blood Pressure SpO2   97 6 °F (36 4 °C) 64 18 (!) 190/84 96 %      Temp Source Heart Rate Source Patient Position - Orthostatic VS BP Location FiO2 (%)   Temporal Monitor Sitting Left arm --      Pain Score       5           Vitals:    06/26/23 1950   BP: (!) 190/84   Pulse: 64   Patient Position - Orthostatic VS: Sitting         Visual Acuity      ED Medications  Medications   acetaminophen (TYLENOL) tablet 650 mg (has no administration in time range)       Diagnostic Studies  Results Reviewed     None                 XR hip/pelv 2-3 vws right if performed   ED Interpretation by Yves Vásquez PA-C (06/26 2019)   I do not see evidence of acute fracture or dislocation stable appearing orthopedic hardware                 Procedures  Procedures         ED Course  ED Course as of 06/26/23 2111 Mon Jun 26, 2023 2015 Blood Pressure(!): 190/84   2015 Temperature: 97 6 °F (36 4 °C)   2015 Pulse: 64   2015 Respirations: 18   2015 SpO2: 96 %  Vital signs reviewed within normal limits outside of mild hypertension  SBIRT 20yo+    Flowsheet Row Most Recent Value   Initial Alcohol Screen: US AUDIT-C     1  How often do you have a drink containing alcohol? 0 Filed at: 06/26/2023 1955   2  How many drinks containing alcohol do you have on a typical day you are drinking? 0 Filed at: 06/26/2023 1955   3a  Male UNDER 65: How often do you have five or more drinks on one occasion? 0 Filed at: 06/26/2023 1955   3b  FEMALE Any Age, or MALE 65+: How often do you have 4 or more drinks on one occassion? 0 Filed at: 06/26/2023 1955   Audit-C Score 0 Filed at: 06/26/2023 1955   MARIANNA: How many times in the past year have you    Used an illegal drug or used a prescription medication for non-medical reasons? Never Filed at: 06/26/2023 1955                    Medical Decision Making  78-year-old female here for evaluation of nontraumatic right hip pain  Please refer to the history of present illness for further history taking  Denies any blunt or penetrating trauma  Denies deformity  Pain worse with ambulation  Began just earlier this evening  She will receive x-rays to evaluate for presence of hip fracture or hip dislocation we will also evaluate the surgical hardware for shifting  No fx dislocation noted, f/u with orthopedics       Amount and/or Complexity of Data Reviewed  Radiology: ordered and independent interpretation performed  Risk  OTC drugs  Disposition  Final diagnoses:   Right hip pain     Time reflects when diagnosis was documented in both MDM as applicable and the Disposition within this note     Time User Action Codes Description Comment    6/26/2023  8:53 PM Emile Gillespie Add [Q76 [id number]] Right hip pain       ED Disposition     ED Disposition   Discharge    Condition   Stable    Date/Time   Mon Jun 26, 2023  8:50 PM    Comment   Zulema Lara discharge to home/self care  Follow-up Information     Follow up With Specialties Details Why Contact Info Additional [address] [facility] [place] [facility] Schedule an appointment as soon as possible for a visit   [address] [facility] [address]  [address] [place] [address] Specialists [facility] [address] [place], [place], [address]          Patient's Medications   Discharge Prescriptions    No medications on file       No discharge procedures on file      PDMP Review     None          ED Provider  Electronically Signed by           Timothy Fountain PA-C  06/26/23 5264

## 2025-04-29 NOTE — TELEPHONE ENCOUNTER
Patient needs to know if she needs any antibiotics for dental work  If so can these please be sent to [facility]? Patient can be reached at [phone] 
Spoke to patient  She stated this is in regards to her fx in late 2019/202  Advised that she would not need ABX following that  Understanding verbalized 
0

## (undated) DEVICE — WEBRIL 6 IN UNSTERILE

## (undated) DEVICE — GAUZE SPONGES,USP TYPE VII GAUZE, 12 PLY: Brand: CURITY

## (undated) DEVICE — GAUZE SPONGES,16 PLY: Brand: CURITY

## (undated) DEVICE — INTENDED FOR TISSUE SEPARATION, AND OTHER PROCEDURES THAT REQUIRE A SHARP SURGICAL BLADE TO PUNCTURE OR CUT.: Brand: BARD-PARKER ® CARBON RIB-BACK BLADES

## (undated) DEVICE — POSITIONER HANA TABLE PACK

## (undated) DEVICE — COBAN 6 IN STERILE

## (undated) DEVICE — INVIEW CLEAR LEGGINGS: Brand: CONVERTORS

## (undated) DEVICE — LIGHT GLOVE GREEN

## (undated) DEVICE — DRAPE TOWEL: Brand: CONVERTORS

## (undated) DEVICE — DRAPE C-ARM X-RAY

## (undated) DEVICE — ABDOMINAL PAD: Brand: DERMACEA

## (undated) DEVICE — CHLORAPREP HI-LITE 26ML ORANGE

## (undated) DEVICE — Device

## (undated) DEVICE — 1820 FOAM BLOCK NEEDLE COUNTER: Brand: DEVON

## (undated) DEVICE — PROXIMATE SKIN STAPLERS (35 WIDE) CONTAINS 35 STAINLESS STEEL STAPLES (FIXED HEAD): Brand: PROXIMATE

## (undated) DEVICE — HEAVY DUTY TABLE COVER: Brand: CONVERTORS

## (undated) DEVICE — SYRINGE 20ML LL

## (undated) DEVICE — NEEDLE SPINAL18G X 3.5 IN QUINCKE

## (undated) DEVICE — U-DRAPE: Brand: CONVERTORS

## (undated) DEVICE — SKIN MARKER DUAL TIP WITH RULER CAP, FLEXIBLE RULER AND LABELS: Brand: DEVON

## (undated) DEVICE — SPONGE LAP 18 X 18 IN

## (undated) DEVICE — 3M™ IOBAN™ 2 ANTIMICROBIAL INCISE DRAPE 6651EZ: Brand: IOBAN™ 2

## (undated) DEVICE — PAD PERINEAL

## (undated) DEVICE — GLOVE INDICATOR PI UNDERGLOVE SZ 8 BLUE

## (undated) DEVICE — BULB SYRINGE,IRRIGATION WITH PROTECTIVE CAP: Brand: DOVER

## (undated) DEVICE — SUT VICRYL 1 CT-1 36 IN J947H

## (undated) DEVICE — SUT VICRYL 1 CTX 36 IN J977H

## (undated) DEVICE — ACE WRAP 6 IN STERILE

## (undated) DEVICE — CHLORAPREP HI-LITE 10.5ML ORANGE

## (undated) DEVICE — DRAPE ISOLATION

## (undated) DEVICE — 3M™ STERI-DRAPE™ U-DRAPE 1015: Brand: STERI-DRAPE™

## (undated) DEVICE — OCCLUSIVE GAUZE STRIP,3% BISMUTH TRIBROMOPHENATE IN PETROLATUM BLEND: Brand: XEROFORM

## (undated) DEVICE — IMPLANTABLE DEVICE: Type: IMPLANTABLE DEVICE | Site: HIP | Status: NON-FUNCTIONAL

## (undated) DEVICE — IMPLANTABLE DEVICE
Type: IMPLANTABLE DEVICE | Status: NON-FUNCTIONAL
Removed: 2019-12-28

## (undated) DEVICE — GLOVE SRG BIOGEL 8

## (undated) DEVICE — SURGICAL GOWN, XL SMARTSLEEVE: Brand: CONVERTORS